# Patient Record
Sex: FEMALE | Race: OTHER | HISPANIC OR LATINO
[De-identification: names, ages, dates, MRNs, and addresses within clinical notes are randomized per-mention and may not be internally consistent; named-entity substitution may affect disease eponyms.]

---

## 2017-05-25 ENCOUNTER — APPOINTMENT (OUTPATIENT)
Dept: FAMILY MEDICINE | Facility: HOSPITAL | Age: 37
End: 2017-05-25

## 2019-01-17 ENCOUNTER — APPOINTMENT (OUTPATIENT)
Dept: FAMILY MEDICINE | Facility: HOSPITAL | Age: 39
End: 2019-01-17

## 2019-01-17 ENCOUNTER — OUTPATIENT (OUTPATIENT)
Dept: OUTPATIENT SERVICES | Facility: HOSPITAL | Age: 39
LOS: 1 days | End: 2019-01-17
Payer: SELF-PAY

## 2019-01-17 VITALS
SYSTOLIC BLOOD PRESSURE: 116 MMHG | WEIGHT: 169 LBS | BODY MASS INDEX: 34.72 KG/M2 | TEMPERATURE: 98 F | HEART RATE: 67 BPM | OXYGEN SATURATION: 100 % | DIASTOLIC BLOOD PRESSURE: 77 MMHG | RESPIRATION RATE: 16 BRPM

## 2019-01-17 DIAGNOSIS — Z00.00 ENCOUNTER FOR GENERAL ADULT MEDICAL EXAMINATION WITHOUT ABNORMAL FINDINGS: ICD-10-CM

## 2019-01-17 DIAGNOSIS — Z92.29 PERSONAL HISTORY OF OTHER DRUG THERAPY: ICD-10-CM

## 2019-01-17 PROCEDURE — 84443 ASSAY THYROID STIM HORMONE: CPT

## 2019-01-17 PROCEDURE — 80053 COMPREHEN METABOLIC PANEL: CPT

## 2019-01-17 PROCEDURE — 86706 HEP B SURFACE ANTIBODY: CPT

## 2019-01-17 PROCEDURE — 86787 VARICELLA-ZOSTER ANTIBODY: CPT

## 2019-01-17 PROCEDURE — 86765 RUBEOLA ANTIBODY: CPT

## 2019-01-17 PROCEDURE — 83036 HEMOGLOBIN GLYCOSYLATED A1C: CPT

## 2019-01-17 PROCEDURE — 80307 DRUG TEST PRSMV CHEM ANLYZR: CPT

## 2019-01-17 PROCEDURE — 80061 LIPID PANEL: CPT

## 2019-01-17 PROCEDURE — 86735 MUMPS ANTIBODY: CPT

## 2019-01-17 PROCEDURE — 86762 RUBELLA ANTIBODY: CPT

## 2019-01-17 PROCEDURE — G0463: CPT

## 2019-01-17 NOTE — PHYSICAL EXAM
[No Acute Distress] : no acute distress [Well Nourished] : well nourished [Well Developed] : well developed [Well-Appearing] : well-appearing [Normal Sclera/Conjunctiva] : normal sclera/conjunctiva [PERRL] : pupils equal round and reactive to light [Normal Outer Ear/Nose] : the outer ears and nose were normal in appearance [Normal Oropharynx] : the oropharynx was normal [No JVD] : no jugular venous distention [Supple] : supple [No Lymphadenopathy] : no lymphadenopathy [Thyroid Normal, No Nodules] : the thyroid was normal and there were no nodules present [No Respiratory Distress] : no respiratory distress  [Clear to Auscultation] : lungs were clear to auscultation bilaterally [No Accessory Muscle Use] : no accessory muscle use [Normal Rate] : normal rate  [Regular Rhythm] : with a regular rhythm [Normal S1, S2] : normal S1 and S2 [No Murmur] : no murmur heard [No Edema] : there was no peripheral edema [No Extremity Clubbing/Cyanosis] : no extremity clubbing/cyanosis [Soft] : abdomen soft [Non Tender] : non-tender [Non-distended] : non-distended [No Masses] : no abdominal mass palpated [Normal Posterior Cervical Nodes] : no posterior cervical lymphadenopathy [Normal Anterior Cervical Nodes] : no anterior cervical lymphadenopathy [No CVA Tenderness] : no CVA  tenderness [No Rash] : no rash [Normal Gait] : normal gait [Coordination Grossly Intact] : coordination grossly intact [Normal Affect] : the affect was normal [Alert and Oriented x3] : oriented to person, place, and time [Normal Insight/Judgement] : insight and judgment were intact

## 2019-01-17 NOTE — PAST MEDICAL HISTORY
[Menstruating] : menstruating [Definite ___ (Date)] : the last menstrual period was [unfilled] [Normal Amount/Duration] : it was of a normal amount and duration [Normal Duration] : the duration was normal [Regular Cycle Intervals] : have been regular

## 2019-01-18 LAB
ALBUMIN SERPL ELPH-MCNC: 3.9 G/DL
ALP BLD-CCNC: 74 U/L
ALT SERPL-CCNC: 12 U/L
ANION GAP SERPL CALC-SCNC: 12 MMOL/L
AST SERPL-CCNC: 20 U/L
BASOPHILS # BLD AUTO: 0.02 K/UL
BASOPHILS NFR BLD AUTO: 0.4 %
BILIRUB SERPL-MCNC: 0.3 MG/DL
BUN SERPL-MCNC: 10 MG/DL
CALCIUM SERPL-MCNC: 8.7 MG/DL
CHLORIDE SERPL-SCNC: 106 MMOL/L
CHOLEST SERPL-MCNC: 129 MG/DL
CHOLEST/HDLC SERPL: 2.7 RATIO
CO2 SERPL-SCNC: 21 MMOL/L
CREAT SERPL-MCNC: 0.5 MG/DL
EOSINOPHIL # BLD AUTO: 0.12 K/UL
EOSINOPHIL NFR BLD AUTO: 2.2 %
GLUCOSE SERPL-MCNC: 80 MG/DL
HBA1C MFR BLD HPLC: 5.4 %
HCT VFR BLD CALC: 39.6 %
HDLC SERPL-MCNC: 47 MG/DL
HGB BLD-MCNC: 12.6 G/DL
IMM GRANULOCYTES NFR BLD AUTO: 0.2 %
LDLC SERPL CALC-MCNC: 67 MG/DL
LYMPHOCYTES # BLD AUTO: 1.04 K/UL
LYMPHOCYTES NFR BLD AUTO: 19 %
MAN DIFF?: NORMAL
MCHC RBC-ENTMCNC: 29.5 PG
MCHC RBC-ENTMCNC: 31.8 GM/DL
MCV RBC AUTO: 92.7 FL
MEV IGG FLD QL IA: 99.6 AU/ML
MEV IGG+IGM SER-IMP: POSITIVE
MONOCYTES # BLD AUTO: 0.27 K/UL
MONOCYTES NFR BLD AUTO: 4.9 %
MUV AB SER-ACNC: POSITIVE
MUV IGG SER QL IA: 57.5 AU/ML
NEUTROPHILS # BLD AUTO: 4.02 K/UL
NEUTROPHILS NFR BLD AUTO: 73.3 %
PLATELET # BLD AUTO: 275 K/UL
POTASSIUM SERPL-SCNC: 4.2 MMOL/L
PROT SERPL-MCNC: 6.9 G/DL
RBC # BLD: 4.27 M/UL
RBC # FLD: 14.4 %
RUBV IGG FLD-ACNC: 5.1 INDEX
RUBV IGG SER-IMP: POSITIVE
SODIUM SERPL-SCNC: 139 MMOL/L
TRIGL SERPL-MCNC: 74 MG/DL
TSH SERPL-ACNC: 1.02 UIU/ML
VZV AB TITR SER: POSITIVE
VZV IGG SER IF-ACNC: 1353 INDEX
WBC # FLD AUTO: 5.48 K/UL

## 2019-01-23 NOTE — HISTORY OF PRESENT ILLNESS
[FreeTextEntry1] : Needs CPE and utox for work [de-identified] : 38F comes in for CPE and blood tests. Feels well, has no complaints at this time. Works in [employer], needs blood work and CPE for them.

## 2019-01-23 NOTE — HEALTH RISK ASSESSMENT
[No falls in past year] : Patient reported no falls in the past year [0] : 2) Feeling down, depressed, or hopeless: Not at all (0) [] : No [[id number]] : 0

## 2019-01-23 NOTE — ASSESSMENT
[FreeTextEntry1] : 38F comes in for CPE and blood work.\par \par 1. Health maintenance\par f/u CBC, CMP, lipids, TSH, Hba1c\par For work - f/u MMR, varicella, and hep B vaccine titers & utox\par Pt instructed she may need to repeat utox at facility requested at work\par \par RTC in 1-2 weeks for discuss obesity & lab work\par \par Discussed w/ Dr. West

## 2019-01-24 LAB
AMPHET UR-MCNC: NEGATIVE
BARBITURATES UR-MCNC: NEGATIVE
BENZODIAZ UR-MCNC: NEGATIVE
COCAINE METAB.OTHER UR-MCNC: NEGATIVE
CREATININE, URINE: 151.3 MG/DL
HBV SURFACE AB SER QL: NONREACTIVE
METHADONE UR-MCNC: NEGATIVE
METHAQUALONE UR-MCNC: NEGATIVE
OPIATES UR-MCNC: NEGATIVE
PCP UR-MCNC: NEGATIVE
PROPOXYPH UR QL: NEGATIVE
THC UR QL: NEGATIVE

## 2019-01-31 ENCOUNTER — APPOINTMENT (OUTPATIENT)
Age: 39
End: 2019-01-31

## 2019-01-31 ENCOUNTER — OUTPATIENT (OUTPATIENT)
Dept: OUTPATIENT SERVICES | Facility: HOSPITAL | Age: 39
LOS: 1 days | End: 2019-01-31
Payer: SELF-PAY

## 2019-01-31 VITALS
OXYGEN SATURATION: 100 % | HEIGHT: 58.5 IN | TEMPERATURE: 98.2 F | BODY MASS INDEX: 33.71 KG/M2 | SYSTOLIC BLOOD PRESSURE: 121 MMHG | RESPIRATION RATE: 16 BRPM | DIASTOLIC BLOOD PRESSURE: 78 MMHG | WEIGHT: 165 LBS | HEART RATE: 91 BPM

## 2019-01-31 DIAGNOSIS — Z11.1 ENCOUNTER FOR SCREENING FOR RESPIRATORY TUBERCULOSIS: ICD-10-CM

## 2019-01-31 DIAGNOSIS — E55.9 VITAMIN D DEFICIENCY, UNSPECIFIED: ICD-10-CM

## 2019-01-31 DIAGNOSIS — Z86.39 PERSONAL HISTORY OF OTHER ENDOCRINE, NUTRITIONAL AND METABOLIC DISEASE: ICD-10-CM

## 2019-01-31 DIAGNOSIS — Z00.00 ENCOUNTER FOR GENERAL ADULT MEDICAL EXAMINATION W/OUT ABNORMAL FINDINGS: ICD-10-CM

## 2019-01-31 DIAGNOSIS — Z11.3 ENCOUNTER FOR SCREENING FOR INFECTIONS WITH A PREDOMINANTLY SEXUAL MODE OF TRANSMISSION: ICD-10-CM

## 2019-01-31 DIAGNOSIS — Z00.00 ENCOUNTER FOR GENERAL ADULT MEDICAL EXAMINATION WITHOUT ABNORMAL FINDINGS: ICD-10-CM

## 2019-01-31 PROCEDURE — G0463: CPT

## 2019-01-31 NOTE — ASSESSMENT
[FreeTextEntry1] : 38 year old female pt here today for follow up visit\par \par #Encounter for follow up visit\par -lab results discussed with pt\par -print out of lab results given to pt\par -RTC prn\par \par

## 2019-01-31 NOTE — HISTORY OF PRESENT ILLNESS
[FreeTextEntry1] : follow up visit [de-identified] : 38 year old female pt here today to follow up for lab results related to new job. All questions related to blood work answered.

## 2019-01-31 NOTE — PHYSICAL EXAM
[No Acute Distress] : no acute distress [No Respiratory Distress] : no respiratory distress  [Clear to Auscultation] : lungs were clear to auscultation bilaterally [No Accessory Muscle Use] : no accessory muscle use [Normal Rate] : normal rate  [Regular Rhythm] : with a regular rhythm [Normal S1, S2] : normal S1 and S2 [Soft] : abdomen soft [Non Tender] : non-tender [Non-distended] : non-distended

## 2019-12-26 ENCOUNTER — OUTPATIENT (OUTPATIENT)
Dept: OUTPATIENT SERVICES | Facility: HOSPITAL | Age: 39
LOS: 1 days | End: 2019-12-26
Payer: SELF-PAY

## 2019-12-26 ENCOUNTER — APPOINTMENT (OUTPATIENT)
Dept: FAMILY MEDICINE | Facility: HOSPITAL | Age: 39
End: 2019-12-26

## 2019-12-26 ENCOUNTER — MED ADMIN CHARGE (OUTPATIENT)
Age: 39
End: 2019-12-26

## 2019-12-26 VITALS
DIASTOLIC BLOOD PRESSURE: 80 MMHG | OXYGEN SATURATION: 98 % | TEMPERATURE: 98 F | BODY MASS INDEX: 33.28 KG/M2 | HEART RATE: 69 BPM | SYSTOLIC BLOOD PRESSURE: 116 MMHG | WEIGHT: 162 LBS

## 2019-12-26 DIAGNOSIS — Z00.00 ENCOUNTER FOR GENERAL ADULT MEDICAL EXAMINATION WITHOUT ABNORMAL FINDINGS: ICD-10-CM

## 2019-12-26 DIAGNOSIS — Z09 ENCOUNTER FOR FOLLOW-UP EXAMINATION AFTER COMPLETED TREATMENT FOR CONDITIONS OTHER THAN MALIGNANT NEOPLASM: ICD-10-CM

## 2019-12-26 PROCEDURE — G0008: CPT

## 2019-12-26 PROCEDURE — G0463: CPT

## 2019-12-27 ENCOUNTER — OUTPATIENT (OUTPATIENT)
Dept: OUTPATIENT SERVICES | Facility: HOSPITAL | Age: 39
LOS: 1 days | End: 2019-12-27
Payer: SELF-PAY

## 2019-12-27 DIAGNOSIS — Z00.00 ENCOUNTER FOR GENERAL ADULT MEDICAL EXAMINATION WITHOUT ABNORMAL FINDINGS: ICD-10-CM

## 2019-12-27 PROBLEM — Z09 FOLLOW-UP EXAM: Status: RESOLVED | Noted: 2019-01-31 | Resolved: 2019-12-27

## 2019-12-27 LAB
BASOPHILS # BLD AUTO: 0.04 K/UL — SIGNIFICANT CHANGE UP (ref 0–0.2)
BASOPHILS NFR BLD AUTO: 0.8 % — SIGNIFICANT CHANGE UP (ref 0–2)
EOSINOPHIL # BLD AUTO: 0.17 K/UL — SIGNIFICANT CHANGE UP (ref 0–0.5)
EOSINOPHIL NFR BLD AUTO: 3.3 % — SIGNIFICANT CHANGE UP (ref 0–6)
HCT VFR BLD CALC: 36.3 % — SIGNIFICANT CHANGE UP (ref 34.5–45)
HGB BLD-MCNC: 12.1 G/DL — SIGNIFICANT CHANGE UP (ref 11.5–15.5)
IMM GRANULOCYTES NFR BLD AUTO: 0.2 % — SIGNIFICANT CHANGE UP (ref 0–1.5)
LYMPHOCYTES # BLD AUTO: 0.73 K/UL — LOW (ref 1–3.3)
LYMPHOCYTES # BLD AUTO: 14.2 % — SIGNIFICANT CHANGE UP (ref 13–44)
MCHC RBC-ENTMCNC: 30.4 PG — SIGNIFICANT CHANGE UP (ref 27–34)
MCHC RBC-ENTMCNC: 33.3 GM/DL — SIGNIFICANT CHANGE UP (ref 32–36)
MCV RBC AUTO: 91.2 FL — SIGNIFICANT CHANGE UP (ref 80–100)
MONOCYTES # BLD AUTO: 0.31 K/UL — SIGNIFICANT CHANGE UP (ref 0–0.9)
MONOCYTES NFR BLD AUTO: 6 % — SIGNIFICANT CHANGE UP (ref 2–14)
NEUTROPHILS # BLD AUTO: 3.89 K/UL — SIGNIFICANT CHANGE UP (ref 1.8–7.4)
NEUTROPHILS NFR BLD AUTO: 75.5 % — SIGNIFICANT CHANGE UP (ref 43–77)
NRBC # BLD: 0 /100 WBCS — SIGNIFICANT CHANGE UP (ref 0–0)
PLATELET # BLD AUTO: 233 K/UL — SIGNIFICANT CHANGE UP (ref 150–400)
RBC # BLD: 3.98 M/UL — SIGNIFICANT CHANGE UP (ref 3.8–5.2)
RBC # FLD: 13.4 % — SIGNIFICANT CHANGE UP (ref 10.3–14.5)
WBC # BLD: 5.15 K/UL — SIGNIFICANT CHANGE UP (ref 3.8–10.5)
WBC # FLD AUTO: 5.15 K/UL — SIGNIFICANT CHANGE UP (ref 3.8–10.5)

## 2019-12-27 PROCEDURE — 86780 TREPONEMA PALLIDUM: CPT

## 2019-12-27 PROCEDURE — 83036 HEMOGLOBIN GLYCOSYLATED A1C: CPT

## 2019-12-27 PROCEDURE — 80061 LIPID PANEL: CPT

## 2019-12-27 PROCEDURE — 85027 COMPLETE CBC AUTOMATED: CPT

## 2019-12-27 PROCEDURE — 87800 DETECT AGNT MULT DNA DIREC: CPT

## 2019-12-27 PROCEDURE — 80053 COMPREHEN METABOLIC PANEL: CPT

## 2019-12-27 PROCEDURE — 82306 VITAMIN D 25 HYDROXY: CPT

## 2019-12-27 PROCEDURE — 87624 HPV HI-RISK TYP POOLED RSLT: CPT

## 2019-12-27 PROCEDURE — 86695 HERPES SIMPLEX TYPE 1 TEST: CPT

## 2019-12-27 PROCEDURE — 84443 ASSAY THYROID STIM HORMONE: CPT

## 2019-12-27 PROCEDURE — 87389 HIV-1 AG W/HIV-1&-2 AB AG IA: CPT

## 2019-12-27 NOTE — HEALTH RISK ASSESSMENT
[Good] : ~his/her~  mood as  good [No] : In the past 12 months have you used drugs other than those required for medical reasons? No [0] : 2) Feeling down, depressed, or hopeless: Not at all (0) [Patient reported PAP Smear was normal] : Patient reported PAP Smear was normal [HIV Test offered] : HIV Test offered [With Family] : lives with family [# of Members in Household ___] :  household currently consist of [unfilled] member(s) [Employed] : employed [# Of Children ___] : has [unfilled] children [Sexually Active] : sexually active [Feels Safe at Home] : Feels safe at home [FreeTextEntry1] : Vaginal mass noted 1 weeks ago [] : No [[id number]] : 0 [PapSmearDate] : 05/14 [FreeTextEntry3] :  [PapSmearComments] : Perform

## 2019-12-27 NOTE — PAST MEDICAL HISTORY
[Menstruating] : menstruating [Definite ___ (Date)] : the last menstrual period was [unfilled] [Normal Duration] : the duration was normal [Regular Cycle Intervals] : have been regular [Total Preg ___] : G[unfilled] [Live Births ___] : P[unfilled]  [Living ___] : Living: [unfilled]

## 2019-12-27 NOTE — PLAN
[FreeTextEntry1] : Vaginal Mass\par -Likely Bartholin cyst\par -F/u GC, HSV 1/2, HIV, Syphilis screening\par -Will schedule visit for I&D\par \par CPE\par -CBC, Lipid panel, TSH, Vitamin D ordered\par -HA1c ordered due to FMHx of diabetes and previous elevated A1C.\par -Pap/Hr mRNA performed today\par \par RTC 2 weeks for lab f/u and I&D\par \par

## 2019-12-27 NOTE — HISTORY OF PRESENT ILLNESS
[FreeTextEntry1] : CPE [de-identified] : 38 y/o F no significant PMHx presents for CPE. Would like to have bloodwork done and exam of vaginal mass she noticed 1 week ago. Felt like a hard lump but denies any pain, bleeding, discharge. There is no pain with intercourse. LMP was 12/4, lasting 3-4 days. Not painful, no bleeding or discharge, no pain with intercourse. LMP 12/4 monthly 3-4 days. Other wise feeling well. Denies fever, dysuria, SOB,CP, abd pain, history of STDS. Sexually active with one male. Would also like to have pap smear.

## 2019-12-27 NOTE — PHYSICAL EXAM
[No Acute Distress] : no acute distress [Well Nourished] : well nourished [Well Developed] : well developed [Well-Appearing] : well-appearing [Normal Sclera/Conjunctiva] : normal sclera/conjunctiva [PERRL] : pupils equal round and reactive to light [EOMI] : extraocular movements intact [Normal Outer Ear/Nose] : the outer ears and nose were normal in appearance [Normal Oropharynx] : the oropharynx was normal [No JVD] : no jugular venous distention [No Lymphadenopathy] : no lymphadenopathy [Supple] : supple [Thyroid Normal, No Nodules] : the thyroid was normal and there were no nodules present [No Respiratory Distress] : no respiratory distress  [No Accessory Muscle Use] : no accessory muscle use [Clear to Auscultation] : lungs were clear to auscultation bilaterally [Normal Rate] : normal rate  [Regular Rhythm] : with a regular rhythm [Normal S1, S2] : normal S1 and S2 [Pedal Pulses Present] : the pedal pulses are present [No Murmur] : no murmur heard [No Edema] : there was no peripheral edema [Rt] : varicose veins of the right leg noted [Lt] : varicose veins of the left leg noted [Normal Appearance] : normal in appearance [No Nipple Discharge] : no nipple discharge [No Axillary Lymphadenopathy] : no axillary lymphadenopathy [Examination Of The Breasts] : a normal appearance [Normal] : normal [No Discharge] : no discharge [No Masses] : no breast masses were palpable [Urethral Meatus] : the meatus of the urethra showed no abnormalities [Cervix] : normal cervix [Uterine Adnexae] : normal adnexa [Uterus] : uterus was normal size, without masses or tenderness [Mass ___ cm] : a [unfilled] ~Ucm mass [Normal Posterior Cervical Nodes] : no posterior cervical lymphadenopathy [No CVA Tenderness] : no CVA  tenderness [Normal Anterior Cervical Nodes] : no anterior cervical lymphadenopathy [No Spinal Tenderness] : no spinal tenderness [Grossly Normal Strength/Tone] : grossly normal strength/tone [Coordination Grossly Intact] : coordination grossly intact [Normal Insight/Judgement] : insight and judgment were intact [Normal Affect] : the affect was normal [Normal Gait] : normal gait [Breast Palpation Diffuse Fibrous Tissue Bilateral] : no fibrocystic changes [Dimpling In Both Breasts] : no dimpling [Breast - Peau D'Orange Bilateral] : no Peau D'Orange [Breast Implant Bilateral] : no implants [Axillary Lymph Nodes Enlarged Bilaterally] : no enlarged nodes [FreeTextEntry1] : Right labial mass palpated, Bartholin gland enlargement approx 3cm, firm, not TTP, no discharge

## 2019-12-27 NOTE — REVIEW OF SYSTEMS
[Fever] : no fever [Fatigue] : no fatigue [Hearing Loss] : no hearing loss [Pain] : no pain [Vision Problems] : no vision problems [Sore Throat] : no sore throat [Nasal Discharge] : no nasal discharge [Palpitations] : no palpitations [Leg Claudication] : no leg claudication [Chest Pain] : no chest pain [Lower Ext Edema] : no lower extremity edema [Shortness Of Breath] : no shortness of breath [Abdominal Pain] : no abdominal pain [Nausea] : no nausea [Wheezing] : no wheezing [Constipation] : no constipation [Vomiting] : no vomiting [Diarrhea] : diarrhea [Frequency] : no frequency [Dysuria] : no dysuria [Vaginal Discharge] : no vaginal discharge [Dysmenorrhea] : no dysmenorrhea [Joint Pain] : no joint pain [Back Pain] : no back pain [Itching] : no itching [Skin Rash] : no skin rash [Headache] : no headache [Dizziness] : no dizziness [Suicidal] : not suicidal [Anxiety] : no anxiety [Depression] : no depression [FreeTextEntry8] : vaginal mass w/o pain

## 2019-12-27 NOTE — ASSESSMENT
[FreeTextEntry1] : 38 y/o F no significant PMHx presents for CPE. Would like to have bloodwork done and exam of vaginal mass she noticed 1 week ago.

## 2019-12-28 DIAGNOSIS — Z12.4 ENCOUNTER FOR SCREENING FOR MALIGNANT NEOPLASM OF CERVIX: ICD-10-CM

## 2019-12-28 DIAGNOSIS — N89.8 OTHER SPECIFIED NONINFLAMMATORY DISORDERS OF VAGINA: ICD-10-CM

## 2020-01-02 ENCOUNTER — APPOINTMENT (OUTPATIENT)
Dept: FAMILY MEDICINE | Facility: HOSPITAL | Age: 40
End: 2020-01-02

## 2020-01-14 LAB
25(OH)D3 SERPL-MCNC: 22.7 NG/ML
ALBUMIN SERPL ELPH-MCNC: 3.8 G/DL
ALP BLD-CCNC: 70 U/L
ALT SERPL-CCNC: 14 U/L
ANION GAP SERPL CALC-SCNC: 11 MMOL/L
AST SERPL-CCNC: 14 U/L
BILIRUB SERPL-MCNC: 0.4 MG/DL
BUN SERPL-MCNC: 11 MG/DL
C TRACH RRNA SPEC QL NAA+PROBE: NOT DETECTED
CALCIUM SERPL-MCNC: 8.6 MG/DL
CHLORIDE SERPL-SCNC: 108 MMOL/L
CHOLEST SERPL-MCNC: 126 MG/DL
CHOLEST/HDLC SERPL: 2.6 RATIO
CO2 SERPL-SCNC: 21 MMOL/L
CREAT SERPL-MCNC: 0.49 MG/DL
CYTOLOGY CVX/VAG DOC THIN PREP: NORMAL
ESTIMATED AVERAGE GLUCOSE: 105 MG/DL
GLUCOSE SERPL-MCNC: 96 MG/DL
HBA1C MFR BLD HPLC: 5.3 %
HDLC SERPL-MCNC: 48 MG/DL
HIV1+2 AB SPEC QL IA.RAPID: NONREACTIVE
HPV HIGH+LOW RISK DNA PNL CVX: NOT DETECTED
HSV 1+2 IGG SER IA-IMP: NEGATIVE
HSV 1+2 IGG SER IA-IMP: POSITIVE
HSV1 IGG SER QL: 28.6 INDEX
HSV2 IGG SER QL: 0.15 INDEX
LDLC SERPL CALC-MCNC: 68 MG/DL
N GONORRHOEA RRNA SPEC QL NAA+PROBE: NOT DETECTED
POTASSIUM SERPL-SCNC: 4.2 MMOL/L
PROT SERPL-MCNC: 6.3 G/DL
SODIUM SERPL-SCNC: 140 MMOL/L
SOURCE AMPLIFICATION: NORMAL
T PALLIDUM AB SER QL IA: NEGATIVE
TRIGL SERPL-MCNC: 48 MG/DL
TSH SERPL-ACNC: 1.73 UIU/ML

## 2020-04-15 DIAGNOSIS — R63.0 ANOREXIA: ICD-10-CM

## 2020-04-15 DIAGNOSIS — Z92.89 PERSONAL HISTORY OF OTHER MEDICAL TREATMENT: ICD-10-CM

## 2020-04-15 DIAGNOSIS — R68.89 OTHER GENERAL SYMPTOMS AND SIGNS: ICD-10-CM

## 2020-04-15 DIAGNOSIS — Z98.890 OTHER SPECIFIED POSTPROCEDURAL STATES: ICD-10-CM

## 2020-04-16 PROBLEM — R63.0 POOR APPETITE: Status: ACTIVE | Noted: 2020-04-16

## 2020-04-16 PROBLEM — Z98.890 HISTORY OF PAPANICOLAOU SMEAR: Status: RESOLVED | Noted: 2019-12-26 | Resolved: 2020-04-16

## 2020-04-16 PROBLEM — Z92.89 HISTORY OF PREVIOUS PHYSICAL EXAMINATION: Status: RESOLVED | Noted: 2019-12-26 | Resolved: 2020-04-16

## 2020-04-22 PROBLEM — R68.89 SUSPECTED COVID-19 VIRUS INFECTION: Status: ACTIVE | Noted: 2020-04-22

## 2020-04-22 NOTE — HISTORY OF PRESENT ILLNESS
[Verbal consent obtained from patient] : the patient, [unfilled] [FreeTextEntry1] :  John # [id number]\par \par I called pt and was able to talk to her this time, she said that she didn't feel good , had severe abdominal pain and first came to [facility] and was told that in this hospital they just for COVID patients and since her abdominal pain was excruciating she went to [facility]. she that she  and was told that she had pneumonia on CXR,  and also was told that her abdominal pain may be due to COVID and test done , given antibiotic for pneumonia cephalexin and azithromycin, Potassium 20 MEQ and was discharge home. she received a call that her test is positive, she is almost done with her antibiotic only one day left for her cephalexin. \par she still has abdominal pain. but all other symptoms are resolved\par \par  [Time Spent: ___ minutes] : I have spent [unfilled] minutes with the patient on the telephone

## 2020-04-22 NOTE — PLAN
[FreeTextEntry1] : patient has COVID-19 infection by testing at [facility]. she is doing much better, she received instruction over the phone for that hospital for another week of self quarantine and we discussed the importance of it and need for hydration and well balance diet which are rich in vitamin c and zinc discussed. pt verbalized understanding and I informed her since it is day 7 of her disease she should be aware that the disease may change the course and she can go to ER if any severe issue from abdominal pain to SOB and new high fever.

## 2020-04-22 NOTE — HISTORY OF PRESENT ILLNESS
[FreeTextEntry1] : called pt again today and no answer, could not leave a message, will send a letter

## 2020-04-22 NOTE — PLAN
[FreeTextEntry1] : pt most probably has mild case of COVID from his brother who had mild case and didn't do testing. personal hygiene and hydration and well balance diet including vitamin C and Zinc rich diet d/w pt. will send Mucinex and omeprazole for pt and I will call pt tomorrow. I advised her to go to ER in case of severe SOB.

## 2020-04-22 NOTE — HISTORY OF PRESENT ILLNESS
[Verbal consent obtained from patient] : the patient, [unfilled] [Time Spent: ___ minutes] : I have spent [unfilled] minutes with the patient on the telephone [FreeTextEntry1] : pt called that she is having symptoms of COVID-19 and requested a call back. I called pt , she said that since Monday night she started to have abd/ epigastric pain,and had fever 101 , since then when ever she has fever she takes Tylenol which helps with fever. since this morning she has no fever and didn't take any med. she also said that she has this severe epigastric pain and cough which gives her pain in her throat. she denies chills but has an episode of severe SOB last night which has been resolved, no HA, sneezing,runny nose, body ache, diarrhea. has severe fatigue and moderate loss of appetite. taste and smell sensation intact

## 2020-07-22 ENCOUNTER — APPOINTMENT (OUTPATIENT)
Dept: FAMILY MEDICINE | Facility: HOSPITAL | Age: 40
End: 2020-07-22

## 2020-07-22 ENCOUNTER — OUTPATIENT (OUTPATIENT)
Dept: OUTPATIENT SERVICES | Facility: HOSPITAL | Age: 40
LOS: 1 days | End: 2020-07-22
Payer: MEDICARE

## 2020-07-22 VITALS
BODY MASS INDEX: 34.72 KG/M2 | SYSTOLIC BLOOD PRESSURE: 102 MMHG | RESPIRATION RATE: 14 BRPM | HEART RATE: 81 BPM | TEMPERATURE: 98.24 F | OXYGEN SATURATION: 95 % | WEIGHT: 169 LBS | DIASTOLIC BLOOD PRESSURE: 72 MMHG

## 2020-07-22 DIAGNOSIS — Z87.898 PERSONAL HISTORY OF OTHER SPECIFIED CONDITIONS: ICD-10-CM

## 2020-07-22 DIAGNOSIS — U07.1 COVID-19: ICD-10-CM

## 2020-07-22 DIAGNOSIS — Z00.00 ENCOUNTER FOR GENERAL ADULT MEDICAL EXAMINATION WITHOUT ABNORMAL FINDINGS: ICD-10-CM

## 2020-07-22 DIAGNOSIS — R50.81 FEVER PRESENTING WITH CONDITIONS CLASSIFIED ELSEWHERE: ICD-10-CM

## 2020-07-22 DIAGNOSIS — N89.8 OTHER SPECIFIED NONINFLAMMATORY DISORDERS OF VAGINA: ICD-10-CM

## 2020-07-23 DIAGNOSIS — F50.81 BINGE EATING DISORDER: ICD-10-CM

## 2020-07-23 DIAGNOSIS — U07.1 COVID-19: ICD-10-CM

## 2020-07-23 DIAGNOSIS — N89.8 OTHER SPECIFIED NONINFLAMMATORY DISORDERS OF VAGINA: ICD-10-CM

## 2020-07-23 DIAGNOSIS — Z87.898 PERSONAL HISTORY OF OTHER SPECIFIED CONDITIONS: ICD-10-CM

## 2020-07-23 PROBLEM — R50.81 FEVER IN OTHER DISEASES: Status: RESOLVED | Noted: 2020-04-15 | Resolved: 2020-07-23

## 2020-07-23 PROCEDURE — 86769 SARS-COV-2 COVID-19 ANTIBODY: CPT

## 2020-07-23 PROCEDURE — 80048 BASIC METABOLIC PNL TOTAL CA: CPT

## 2020-07-23 PROCEDURE — G0463: CPT

## 2020-07-23 PROCEDURE — 36415 COLL VENOUS BLD VENIPUNCTURE: CPT

## 2020-07-23 RX ORDER — OMEPRAZOLE 20 MG/1
20 CAPSULE, DELAYED RELEASE ORAL DAILY
Qty: 30 | Refills: 0 | Status: COMPLETED | COMMUNITY
Start: 2020-04-15 | End: 2020-07-23

## 2020-07-23 RX ORDER — GUAIFENESIN 600 MG/1
600 TABLET, EXTENDED RELEASE ORAL TWICE DAILY
Qty: 40 | Refills: 0 | Status: COMPLETED | COMMUNITY
Start: 2020-04-15 | End: 2020-07-23

## 2020-07-23 NOTE — PHYSICAL EXAM
[No Acute Distress] : no acute distress [Well-Appearing] : well-appearing [Clear to Auscultation] : lungs were clear to auscultation bilaterally [No Respiratory Distress] : no respiratory distress  [No Accessory Muscle Use] : no accessory muscle use [Regular Rhythm] : with a regular rhythm [Normal S1, S2] : normal S1 and S2 [Normal Rate] : normal rate  [Soft] : abdomen soft [No Murmur] : no murmur heard [No Masses] : no abdominal mass palpated [Non-distended] : non-distended [Non Tender] : non-tender [Normal Affect] : the affect was normal [Normal Bowel Sounds] : normal bowel sounds [No HSM] : no HSM [Normal Insight/Judgement] : insight and judgment were intact

## 2020-07-23 NOTE — HISTORY OF PRESENT ILLNESS
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 Pt feeling well overall today. Was seen in Dec 2019 for CPE. Pt was sick with COVID19 infection in April but was not hospitalized and has since recovered; denies fever, chills, HA, anosmia, cough, SOB, CP, n/v/d, abd pain. 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 Pt did not bring from from work; states she needs letter stating she received physical within past year and PPD.

## 2020-07-23 NOTE — PLAN
[FreeTextEntry1] : #PPD placement\par -Placed in office today on right forearm; pt understands she must return to ED saturday for PPD read\par \par #Past COVID infection\par -COVID ab test ordered today\par -BMP ordered to f/u on hypokalemia noted while ill\par \par #HCM\par -Tdap today\par \par #Work Clearance form\par -CPE 12/2019, lab reviewed and wnl\par -No form provided from patient today\par -Pt needs letter faxed to her work for clearance. Letter available in chart

## 2020-07-23 NOTE — REVIEW OF SYSTEMS
[Fever] : no fever [Chills] : no chills [Chest Pain] : no chest pain [Palpitations] : no palpitations [Shortness Of Breath] : no shortness of breath [Cough] : no cough [Abdominal Pain] : no abdominal pain [Nausea] : no nausea [Diarrhea] : diarrhea [Vomiting] : no vomiting [Headache] : no headache

## 2020-08-04 LAB
ANION GAP SERPL CALC-SCNC: 11 MMOL/L
BUN SERPL-MCNC: 7 MG/DL
CALCIUM SERPL-MCNC: 8.9 MG/DL
CHLORIDE SERPL-SCNC: 107 MMOL/L
CO2 SERPL-SCNC: 22 MMOL/L
CREAT SERPL-MCNC: 0.51 MG/DL
GLUCOSE SERPL-MCNC: 87 MG/DL
POTASSIUM SERPL-SCNC: 4.4 MMOL/L
SARS-COV-2 IGG SERPL IA-ACNC: 109 INDEX
SARS-COV-2 IGG SERPL QL IA: POSITIVE
SODIUM SERPL-SCNC: 140 MMOL/L

## 2021-06-17 ENCOUNTER — EMERGENCY (EMERGENCY)
Facility: HOSPITAL | Age: 41
LOS: 1 days | Discharge: HOME | End: 2021-06-17
Attending: EMERGENCY MEDICINE | Admitting: EMERGENCY MEDICINE
Payer: MEDICAID

## 2021-06-17 VITALS
WEIGHT: 175.93 LBS | OXYGEN SATURATION: 99 % | SYSTOLIC BLOOD PRESSURE: 108 MMHG | HEART RATE: 93 BPM | RESPIRATION RATE: 18 BRPM | TEMPERATURE: 98 F | DIASTOLIC BLOOD PRESSURE: 69 MMHG | HEIGHT: 59 IN

## 2021-06-17 PROCEDURE — 99283 EMERGENCY DEPT VISIT LOW MDM: CPT

## 2021-06-17 PROCEDURE — 99284 EMERGENCY DEPT VISIT MOD MDM: CPT

## 2021-06-17 RX ORDER — ACETAMINOPHEN 500 MG
650 TABLET ORAL ONCE
Refills: 0 | Status: COMPLETED | OUTPATIENT
Start: 2021-06-17 | End: 2021-06-17

## 2021-06-17 RX ADMIN — Medication 650 MILLIGRAM(S): at 19:25

## 2021-06-17 RX ADMIN — Medication 1 TABLET(S): at 19:26

## 2021-06-17 NOTE — ED PROVIDER NOTE - NSFOLLOWUPINSTRUCTIONS_ED_ALL_ED_FT
Follow up with Family practice Dr maria tomorrow morning at 8:30, you can walk in for an appointment as per Manuel Montoya.   For pain control if needed take Tylenol 650 mg 1 tab every 4-6 hours. In addition complete the Augmentin 875 mg 1 tab twice a day for 7 days. if any fevers, chills, any worsening, concerning or new signs or symporter return to the ER.

## 2021-06-17 NOTE — ED PROVIDER NOTE - OBJECTIVE STATEMENT
42 yo female, no pmh comes to the ED co pelvic pain. States has had left sided vaginal pain with a bump worsening over the past 3 days.  States the bump was smaller a few months ago when she saw gyn. Was told at the time they could drain it but to leave it for now.   As per patient the bump has been getting larger over the past few ,months and more painful over the past 3 days.  Denies any fevers, chills, n/v, vaginal dc, burning, urgency , [id number] on urination, abd pains, or any other complaints.

## 2021-06-17 NOTE — ED PROVIDER NOTE - CLINICAL SUMMARY MEDICAL DECISION MAKING FREE TEXT BOX
42 yo female, no pmh comes to the ED co pelvic pain. States has had left sided vaginal pain with a bump worsening over the past 3 days.  States the bump was smaller a few months ago when she saw gyn. Was told at the time they could drain it but to leave it for now.   As per patient the bump has been getting larger over the past few ,months and more painful over the past 3 days.  Denies any fevers, chills, n/v, vaginal dc, burning, urgency , [id number] on urination, abd pains, or any other complaints.  Left vulva abscess, SW dr Jackson will see patient at 8:30 tomorrow morning in the clinic .  Will start Augmentin in the mean time.

## 2021-06-17 NOTE — ED PROVIDER NOTE - PATIENT PORTAL LINK FT
You can access the FollowMyHealth Patient Portal offered by [facility] by registering at the following website: [email or web address]. By joining [email or web address]’s FollowMyHealth portal, you will also be able to view your health information using other applications (apps) compatible with our system.

## 2021-06-17 NOTE — ED PROVIDER NOTE - ATTENDING CONTRIBUTION TO CARE
I have personally seen and examined this patient. I have fully participated in the care of this patient. I have reviewed all pertinent clinical information, including history physical exam, plan and the PA's note and agree except as noted  42 yo female, no pmh comes to the ED co pelvic pain. States has had left sided vaginal pain with a bump worsening over the past 3 days.  States the bump was smaller a few months ago when she saw gyn. Was told at the time they could drain it but to leave it for now.   As per patient the bump has been getting larger over the past few ,months and more painful over the past 3 days.  Denies any fevers, chills, n/v, vaginal dc, burning, urgency , [id number] on urination, abd pains, or any other complaints.

## 2021-06-18 ENCOUNTER — APPOINTMENT (OUTPATIENT)
Dept: FAMILY MEDICINE | Facility: HOSPITAL | Age: 41
End: 2021-06-18

## 2021-06-18 ENCOUNTER — NON-APPOINTMENT (OUTPATIENT)
Age: 41
End: 2021-06-18

## 2021-06-18 ENCOUNTER — OUTPATIENT (OUTPATIENT)
Dept: OUTPATIENT SERVICES | Facility: HOSPITAL | Age: 41
LOS: 1 days | End: 2021-06-18
Payer: SELF-PAY

## 2021-06-18 VITALS
HEART RATE: 90 BPM | BODY MASS INDEX: 35.75 KG/M2 | WEIGHT: 174 LBS | DIASTOLIC BLOOD PRESSURE: 70 MMHG | OXYGEN SATURATION: 97 % | RESPIRATION RATE: 16 BRPM | SYSTOLIC BLOOD PRESSURE: 106 MMHG | TEMPERATURE: 209.3 F

## 2021-06-18 DIAGNOSIS — Z00.00 ENCOUNTER FOR GENERAL ADULT MEDICAL EXAMINATION WITHOUT ABNORMAL FINDINGS: ICD-10-CM

## 2021-06-18 PROCEDURE — 87070 CULTURE OTHR SPECIMN AEROBIC: CPT

## 2021-06-18 PROCEDURE — G0463: CPT

## 2021-06-18 PROCEDURE — 10060 I&D ABSCESS SIMPLE/SINGLE: CPT

## 2021-06-18 PROCEDURE — 87077 CULTURE AEROBIC IDENTIFY: CPT

## 2021-06-21 DIAGNOSIS — Z98.890 OTHER SPECIFIED POSTPROCEDURAL STATES: ICD-10-CM

## 2021-06-21 DIAGNOSIS — N76.4 ABSCESS OF VULVA: ICD-10-CM

## 2021-06-23 LAB — BACTERIA SPEC CULT: ABNORMAL

## 2021-06-23 NOTE — PLAN
[FreeTextEntry1] : \par \par #Vulvar Abscess\par - 4 cm localized to the left lower labia\par - now s/p incision and drainage of 200 cc of pus like fluid\par - Cultures sent\par - Patient to continue antibiotics, will recommend a total of 10 day treatment, prescription for additional antibiotics sent\par \par \par RTC in 1 week for follow up\par \par \par Above discussed with Dr. Jackson

## 2021-06-23 NOTE — PHYSICAL EXAM
[Well Developed] : well developed [Well-Appearing] : well-appearing [No Respiratory Distress] : no respiratory distress  [Clear to Auscultation] : lungs were clear to auscultation bilaterally [Normal Rate] : normal rate  [Regular Rhythm] : with a regular rhythm [Normal S1, S2] : normal S1 and S2 [Urethral Meatus] : the meatus of the urethra showed no abnormalities [Vagina] : normal vaginal exam [Anus Abnormality] : the anus and perineum were normal [Vulvar Mass ___ cm] : a [unfilled] ~Ucm vulvar mass [de-identified] : D

## 2021-06-23 NOTE — REVIEW OF SYSTEMS
[Fever] : no fever [Chills] : no chills [Fatigue] : no fatigue [Chest Pain] : no chest pain [Palpitations] : no palpitations [Shortness Of Breath] : no shortness of breath [Cough] : no cough [Dyspnea on Exertion] : no dyspnea on exertion [Vaginal Discharge] : no vaginal discharge [Dysmenorrhea] : no dysmenorrhea

## 2021-06-23 NOTE — ASSESSMENT
[FreeTextEntry1] : Patient is a 42 y/o female with medical history as stated above here for acute visit for vulvar abscess:

## 2021-06-23 NOTE — HISTORY OF PRESENT ILLNESS
[FreeTextEntry8] : Patient is a 40 y/o female with no significant medical history presenting for an acute visit for 1 week history of worsening vulvarvaginal pain \par \par \par Patient was seen in the ED yesterday- was told she had an abscess and was prescribed 7 day course of antibiotics.\par \par \par Today she states she is still having pain and has a noticeable swelling in the vulvar area. Reports seeing this one year ago, but more recently has increased in size and worsened in pain. No fevers, chills, nausea, vomiting

## 2021-06-25 ENCOUNTER — OUTPATIENT (OUTPATIENT)
Dept: OUTPATIENT SERVICES | Facility: HOSPITAL | Age: 41
LOS: 1 days | End: 2021-06-25
Payer: SELF-PAY

## 2021-06-25 ENCOUNTER — APPOINTMENT (OUTPATIENT)
Dept: FAMILY MEDICINE | Facility: HOSPITAL | Age: 41
End: 2021-06-25

## 2021-06-25 VITALS
RESPIRATION RATE: 16 BRPM | TEMPERATURE: 207.14 F | BODY MASS INDEX: 43.51 KG/M2 | HEIGHT: 55 IN | DIASTOLIC BLOOD PRESSURE: 77 MMHG | OXYGEN SATURATION: 97 % | SYSTOLIC BLOOD PRESSURE: 112 MMHG | HEART RATE: 72 BPM | WEIGHT: 188 LBS

## 2021-06-25 DIAGNOSIS — Z00.00 ENCOUNTER FOR GENERAL ADULT MEDICAL EXAMINATION WITHOUT ABNORMAL FINDINGS: ICD-10-CM

## 2021-06-25 PROBLEM — Z78.9 OTHER SPECIFIED HEALTH STATUS: Chronic | Status: ACTIVE | Noted: 2021-06-17

## 2021-06-25 PROCEDURE — G0463: CPT

## 2021-06-29 DIAGNOSIS — N76.4 ABSCESS OF VULVA: ICD-10-CM

## 2021-06-30 ENCOUNTER — OUTPATIENT (OUTPATIENT)
Dept: OUTPATIENT SERVICES | Facility: HOSPITAL | Age: 41
LOS: 1 days | End: 2021-06-30
Payer: SELF-PAY

## 2021-06-30 ENCOUNTER — APPOINTMENT (OUTPATIENT)
Dept: FAMILY MEDICINE | Facility: HOSPITAL | Age: 41
End: 2021-06-30

## 2021-06-30 VITALS
HEART RATE: 66 BPM | BODY MASS INDEX: 53.71 KG/M2 | RESPIRATION RATE: 16 BRPM | DIASTOLIC BLOOD PRESSURE: 75 MMHG | OXYGEN SATURATION: 99 % | WEIGHT: 176 LBS | TEMPERATURE: 206.42 F | SYSTOLIC BLOOD PRESSURE: 105 MMHG

## 2021-06-30 VITALS — BODY MASS INDEX: 1237.43 KG/M2 | TEMPERATURE: 96.9 F | HEIGHT: 55 IN

## 2021-06-30 DIAGNOSIS — Z00.00 ENCOUNTER FOR GENERAL ADULT MEDICAL EXAMINATION WITHOUT ABNORMAL FINDINGS: ICD-10-CM

## 2021-06-30 PROCEDURE — G0463: CPT

## 2021-07-01 DIAGNOSIS — N76.4 ABSCESS OF VULVA: ICD-10-CM

## 2021-07-01 NOTE — HISTORY OF PRESENT ILLNESS
[FreeTextEntry1] : Follow up [de-identified] : Patient is a 42 y/o female with history of obesity presenting for follow up visit s/p incision and drainage of vulvar abscess\par \par Today patient states she feels well-\par \par Pain improved, has not noticed any additional drainage, swelling, denies fevers, chills, nausea vomiiting, bleeding\par \par \par

## 2021-07-01 NOTE — REVIEW OF SYSTEMS
[Fever] : no fever [Chills] : no chills [Fatigue] : no fatigue [Chest Pain] : no chest pain [Palpitations] : no palpitations [Shortness Of Breath] : no shortness of breath [Dyspnea on Exertion] : no dyspnea on exertion [Abdominal Pain] : no abdominal pain [Nausea] : no nausea [Vomiting] : no vomiting [Vaginal Discharge] : no vaginal discharge

## 2021-07-01 NOTE — PHYSICAL EXAM
[No Acute Distress] : no acute distress [Well Developed] : well developed [Well-Appearing] : well-appearing [Normal Rate] : normal rate  [Regular Rhythm] : with a regular rhythm [Normal S1, S2] : normal S1 and S2 [Soft] : abdomen soft [Non Tender] : non-tender [Normal Bowel Sounds] : normal bowel sounds [Urethral Meatus] : normal urethra [External Female Genitalia] : normal external genitalia [Vagina] : normal vaginal exam [Uterus] : uterus was normal size, without masses or tenderness [Uterine Adnexae] : normal adnexa

## 2021-07-06 RX ORDER — AMOXICILLIN AND CLAVULANATE POTASSIUM 875; 125 MG/1; MG/1
875-125 TABLET, COATED ORAL
Qty: 6 | Refills: 0 | Status: COMPLETED | COMMUNITY
Start: 2021-06-18 | End: 2021-07-06

## 2021-07-06 NOTE — PHYSICAL EXAM
[No Acute Distress] : no acute distress [Well-Appearing] : well-appearing [No Respiratory Distress] : no respiratory distress  [Clear to Auscultation] : lungs were clear to auscultation bilaterally [Normal Rate] : normal rate  [Regular Rhythm] : with a regular rhythm [Normal S1, S2] : normal S1 and S2 [External Female Genitalia] : normal external genitalia

## 2021-07-06 NOTE — REVIEW OF SYSTEMS
[Fever] : no fever [Chills] : no chills [Abdominal Pain] : no abdominal pain [Nausea] : no nausea [Vomiting] : no vomiting [Dysuria] : no dysuria [Hematuria] : no hematuria [Vaginal Discharge] : no vaginal discharge [Itching] : no itching [Skin Rash] : no skin rash [FreeTextEntry8] : R labial nodule [de-identified] : l

## 2021-12-01 ENCOUNTER — APPOINTMENT (OUTPATIENT)
Dept: FAMILY MEDICINE | Facility: HOSPITAL | Age: 41
End: 2021-12-01

## 2021-12-01 ENCOUNTER — OUTPATIENT (OUTPATIENT)
Dept: OUTPATIENT SERVICES | Facility: HOSPITAL | Age: 41
LOS: 1 days | End: 2021-12-01
Payer: SELF-PAY

## 2021-12-01 VITALS
SYSTOLIC BLOOD PRESSURE: 97 MMHG | TEMPERATURE: 97.6 F | DIASTOLIC BLOOD PRESSURE: 69 MMHG | HEART RATE: 87 BPM | OXYGEN SATURATION: 99 % | RESPIRATION RATE: 14 BRPM | BODY MASS INDEX: 1237.43 KG/M2 | WEIGHT: 176 LBS

## 2021-12-01 DIAGNOSIS — Z00.00 ENCOUNTER FOR GENERAL ADULT MEDICAL EXAMINATION WITHOUT ABNORMAL FINDINGS: ICD-10-CM

## 2021-12-01 PROCEDURE — 86735 MUMPS ANTIBODY: CPT

## 2021-12-01 PROCEDURE — 86762 RUBELLA ANTIBODY: CPT

## 2021-12-01 PROCEDURE — 86765 RUBEOLA ANTIBODY: CPT

## 2021-12-01 PROCEDURE — G0463: CPT

## 2021-12-01 PROCEDURE — 86480 TB TEST CELL IMMUN MEASURE: CPT

## 2021-12-03 LAB
MEV IGG FLD QL IA: 96.3 AU/ML
MEV IGG+IGM SER-IMP: POSITIVE
MUV AB SER-ACNC: POSITIVE
MUV IGG SER QL IA: 65.6 AU/ML
RUBV IGG FLD-ACNC: 7.2 INDEX
RUBV IGG SER-IMP: POSITIVE

## 2021-12-05 DIAGNOSIS — Z02.1 ENCOUNTER FOR PRE-EMPLOYMENT EXAMINATION: ICD-10-CM

## 2021-12-05 DIAGNOSIS — Z23 ENCOUNTER FOR IMMUNIZATION: ICD-10-CM

## 2021-12-08 ENCOUNTER — OUTPATIENT (OUTPATIENT)
Dept: OUTPATIENT SERVICES | Facility: HOSPITAL | Age: 41
LOS: 1 days | End: 2021-12-08
Payer: SELF-PAY

## 2021-12-08 ENCOUNTER — APPOINTMENT (OUTPATIENT)
Dept: FAMILY MEDICINE | Facility: HOSPITAL | Age: 41
End: 2021-12-08

## 2021-12-08 VITALS
DIASTOLIC BLOOD PRESSURE: 69 MMHG | OXYGEN SATURATION: 97 % | SYSTOLIC BLOOD PRESSURE: 100 MMHG | BODY MASS INDEX: 1258.51 KG/M2 | TEMPERATURE: 97.2 F | WEIGHT: 179 LBS | HEART RATE: 75 BPM | RESPIRATION RATE: 14 BRPM

## 2021-12-08 DIAGNOSIS — Z00.00 ENCOUNTER FOR GENERAL ADULT MEDICAL EXAMINATION WITHOUT ABNORMAL FINDINGS: ICD-10-CM

## 2021-12-08 DIAGNOSIS — Z02.89 ENCOUNTER FOR OTHER ADMINISTRATIVE EXAMINATIONS: ICD-10-CM

## 2021-12-08 LAB
M TB IFN-G BLD-IMP: NEGATIVE
QUANTIFERON TB PLUS MITOGEN MINUS NIL: 6.88 IU/ML
QUANTIFERON TB PLUS NIL: 0.02 IU/ML
QUANTIFERON TB PLUS TB1 MINUS NIL: -0.01 IU/ML
QUANTIFERON TB PLUS TB2 MINUS NIL: 0 IU/ML

## 2021-12-08 PROCEDURE — G0463: CPT

## 2021-12-08 NOTE — HISTORY OF PRESENT ILLNESS
[FreeTextEntry1] : CPE  [de-identified] : Patient is a 40 y/o F with a PMH of obesity here for CPE. Patient reports feeling well. She would like to have forms filled out for her work. No acute complaints at this time. Would like to have the flu shot.

## 2021-12-08 NOTE — PLAN
[FreeTextEntry1] : #Pre-employment exam \par - Complete physical exam performed today \par - Ordered necessary titers as well as Quantiferon gold \par \par #HCM\par - Flu shot received today 12/1/21\par \par #Cervical Cancer Screening \par - Last Pap Dec 2019 WNL \par \par Patient to RTC for lab results and to fill out forms. \par Above discussed with Dr. Maguire.

## 2021-12-08 NOTE — PHYSICAL EXAM
[No Acute Distress] : no acute distress [Well Nourished] : well nourished [Well Developed] : well developed [Well-Appearing] : well-appearing [No Respiratory Distress] : no respiratory distress  [No Accessory Muscle Use] : no accessory muscle use [Clear to Auscultation] : lungs were clear to auscultation bilaterally [Normal Rate] : normal rate  [Regular Rhythm] : with a regular rhythm [Normal S1, S2] : normal S1 and S2 [No Murmur] : no murmur heard [Normal Gait] : normal gait

## 2021-12-08 NOTE — HEALTH RISK ASSESSMENT
[Good] : ~his/her~  mood as  good [No] : No [No falls in past year] : Patient reported no falls in the past year [0] : 2) Feeling down, depressed, or hopeless: Not at all (0) [Employed] : employed [Fully functional (bathing, dressing, toileting, transferring, walking, feeding)] : Fully functional (bathing, dressing, toileting, transferring, walking, feeding) [Fully functional (using the telephone, shopping, preparing meals, housekeeping, doing laundry, using] : Fully functional and needs no help or supervision to perform IADLs (using the telephone, shopping, preparing meals, housekeeping, doing laundry, using transportation, managing medications and managing finances) [] : No [de-identified] : none [de-identified] : none

## 2021-12-08 NOTE — HISTORY OF PRESENT ILLNESS
[FreeTextEntry1] : completion of forms for employment  [de-identified] : Patient is a 40 y/o F with a PMH of obesity here to have forms filled out for work. No new complaints since she was last seen on 12/01/21. Titers for MMR and Quantiferon gold results reviewed with the patient. Flu shot received 12/01/21.

## 2021-12-12 DIAGNOSIS — Z02.89 ENCOUNTER FOR OTHER ADMINISTRATIVE EXAMINATIONS: ICD-10-CM

## 2022-08-12 ENCOUNTER — EMERGENCY (EMERGENCY)
Facility: HOSPITAL | Age: 42
LOS: 1 days | Discharge: HOME | End: 2022-08-12
Attending: INTERNAL MEDICINE | Admitting: INTERNAL MEDICINE
Payer: MEDICAID

## 2022-08-12 VITALS
DIASTOLIC BLOOD PRESSURE: 72 MMHG | SYSTOLIC BLOOD PRESSURE: 110 MMHG | HEIGHT: 59 IN | RESPIRATION RATE: 16 BRPM | TEMPERATURE: 98 F | WEIGHT: 175.27 LBS | OXYGEN SATURATION: 99 % | HEART RATE: 80 BPM

## 2022-08-12 PROCEDURE — 87591 N.GONORRHOEAE DNA AMP PROB: CPT

## 2022-08-12 PROCEDURE — 87491 CHLMYD TRACH DNA AMP PROBE: CPT

## 2022-08-12 PROCEDURE — 99284 EMERGENCY DEPT VISIT MOD MDM: CPT

## 2022-08-12 RX ORDER — CEFTRIAXONE 500 MG/1
500 INJECTION, POWDER, FOR SOLUTION INTRAMUSCULAR; INTRAVENOUS ONCE
Refills: 0 | Status: COMPLETED | OUTPATIENT
Start: 2022-08-12 | End: 2022-08-12

## 2022-08-12 RX ADMIN — Medication 100 MILLIGRAM(S): at 09:57

## 2022-08-12 RX ADMIN — CEFTRIAXONE 500 MILLIGRAM(S): 500 INJECTION, POWDER, FOR SOLUTION INTRAMUSCULAR; INTRAVENOUS at 09:57

## 2022-08-12 NOTE — ED PROVIDER NOTE - NSFOLLOWUPINSTRUCTIONS_ED_ALL_ED_FT
Follow up with gynecologist Dr. Jackson, call for an appointment     Please fill the prescription for the antibiotics and take as directed.  Please finish the entire course of medication as prescribed.  If you have any belly pain after the antibiotics, yogurt has been shown to help with this.  Do not use any alcohol or grapefruit juice with any antibiotics.    Stay hydrated    Return to the ER if your symptoms worsen or for any other medical emergencies  ************

## 2022-08-12 NOTE — ED PROVIDER NOTE - CARE PROVIDER_API CALL
Ronal Jackson)  Obstetrics and Gynecology  [address] [facility] [address], [address]  [place] [address]  Phone: [phone]  Fax: [phone]  Follow Up Time:

## 2022-08-12 NOTE — ED PROVIDER NOTE - CLINICAL SUMMARY MEDICAL DECISION MAKING FREE TEXT BOX
41 y/o F presents for vaginal pain x 1 week. Pt was seen for similar ~2 months ago in the ED, was dc'd with abx and had it drained in the FP clinic. Pt denies f/c, urinary sympts, f/c, abd pain, n/v, vaginal dc or vaginal bleeding. PE as noted above. will treat with abx for cellulitis with possibly early abscess. Will dc and place in [facility] f/u book for gyn appt     PCP- FP clinic

## 2022-08-12 NOTE — ED ADULT TRIAGE NOTE - AS HEIGHT TYPE
May 22, 2017      Jesus Alberto Biggs MD  [address] [place] [address] Bld  [address]  Cardiology Center  [place] [address]           [address] - Arrhythmia  [address]  [facility] [address]  Phone: [phone]  Fax: [phone]          Patient: Loreta M Damico   MR Number: [id number]   YOB: 1936   Date of Visit: 5/22/2017       Dear Dr. Jesus Alberto Biggs:    Thank you for referring Loreta Damico to me for evaluation. Attached you will find relevant portions of my assessment and plan of care.    If you have questions, please do not hesitate to call me. I look forward to following Loreta Damico along with you.    Sincerely,    Justus Church MD    Enclosure  CC:  No Recipients    If you would like to receive this communication electronically, please contact [email or web address] or [phone] to request more information on [facility] access.    For providers and/or their staff who would like to refer a patient to [facility], please contact us through our one-stop-shop provider referral line, [facility], at [phone].    If you feel you have received this communication in error or would no longer like to receive these types of communications, please e-mail [email or web address]         
stated

## 2022-08-12 NOTE — ED PROVIDER NOTE - NSFOLLOWUPCLINICS_GEN_ALL_ED_FT
Family Practice Clinic  Family Medicine  [address] [place] [address]  Phone: [phone]  Fax:

## 2022-08-12 NOTE — ED PROVIDER NOTE - PATIENT PORTAL LINK FT
You can access the FollowMyHealth Patient Portal offered by [facility] by registering at the following website: [email or web address]. By joining [employer]’s FollowMyHealth portal, you will also be able to view your health information using other applications (apps) compatible with our system.

## 2022-08-12 NOTE — ED PROVIDER NOTE - ATTENDING APP SHARED VISIT CONTRIBUTION OF CARE
43 y/o F presents for vaginal pain x 1 week. Pt was seen for similar ~2 months ago in the ED, was dc'd with abx and had it drained in the FP clinic. Pt denies f/c, urinary sympts, f/c, abd pain, n/v, vaginal dc or vaginal bleeding. PE as noted above. will treat with abx for cellulitis with possibly early abscess. Will dc and place in [facility] f/u book for gyn appt     PCP- FP clinic  Dr. Montanez:  I have reviewed and discussed with the PA/ resident the case specifics, including the history, physical assessment, evaluation, conclusion, laboratory results, and medical plan. I agree with the contents, and conclusions. I have personally examined, and interviewed the patient.

## 2022-08-12 NOTE — ED PROVIDER NOTE - NS ED ATTENDING STATEMENT MOD
This was a shared visit with the CHARLES. I reviewed and verified the documentation and independently performed the documented:

## 2022-08-12 NOTE — ED PROVIDER NOTE - OBJECTIVE STATEMENT
43 y/o F presents for vaginal pain x 1 week. Pt was seen for similar ~2 months ago in the ED, was dc'd with abx and had it drained in the FP clinic. Pt denies f/c, urinary sympts, f/c, abd pain, n/v, vaginal dc or vaginal bleeding

## 2022-08-13 ENCOUNTER — EMERGENCY (EMERGENCY)
Facility: HOSPITAL | Age: 42
LOS: 1 days | Discharge: HOME | End: 2022-08-13
Attending: INTERNAL MEDICINE | Admitting: INTERNAL MEDICINE
Payer: MEDICAID

## 2022-08-13 VITALS
HEIGHT: 59 IN | RESPIRATION RATE: 16 BRPM | WEIGHT: 177.91 LBS | HEART RATE: 85 BPM | DIASTOLIC BLOOD PRESSURE: 67 MMHG | SYSTOLIC BLOOD PRESSURE: 103 MMHG | TEMPERATURE: 99 F

## 2022-08-13 LAB
C TRACH RRNA SPEC QL NAA+PROBE: SIGNIFICANT CHANGE UP
N GONORRHOEA RRNA SPEC QL NAA+PROBE: SIGNIFICANT CHANGE UP

## 2022-08-13 PROCEDURE — 87077 CULTURE AEROBIC IDENTIFY: CPT

## 2022-08-13 PROCEDURE — 56405 I&D VULVA/PERINEAL ABSCESS: CPT

## 2022-08-13 PROCEDURE — 99284 EMERGENCY DEPT VISIT MOD MDM: CPT | Mod: 25

## 2022-08-13 PROCEDURE — 87070 CULTURE OTHR SPECIMN AEROBIC: CPT

## 2022-08-13 PROCEDURE — 87205 SMEAR GRAM STAIN: CPT

## 2022-08-13 PROCEDURE — 99283 EMERGENCY DEPT VISIT LOW MDM: CPT | Mod: 25

## 2022-08-13 PROCEDURE — 96372 THER/PROPH/DIAG INJ SC/IM: CPT | Mod: XU

## 2022-08-13 RX ORDER — KETOROLAC TROMETHAMINE 30 MG/ML
30 SYRINGE (ML) INJECTION ONCE
Refills: 0 | Status: DISCONTINUED | OUTPATIENT
Start: 2022-08-13 | End: 2022-08-13

## 2022-08-13 RX ADMIN — Medication 30 MILLIGRAM(S): at 18:45

## 2022-08-13 NOTE — ED PROVIDER NOTE - NSFOLLOWUPCLINICS_GEN_ALL_ED_FT
Family Practice Clinic  Family Medicine  [address] [place] [address]  Phone: [phone]  Fax:

## 2022-08-13 NOTE — ED PROVIDER NOTE - PATIENT PORTAL LINK FT
You can access the FollowMyHealth Patient Portal offered by [facility] by registering at the following website: [email or web address]. By joining [employer]’s FollowMyHealth portal, you will also be able to view your health information using other applications (apps) compatible with our system.

## 2022-08-13 NOTE — ED PROVIDER NOTE - NSFOLLOWUPINSTRUCTIONS_ED_ALL_ED_FT
Follow up with GYN Dr. Jackson in 2 days for wound recheck and packing change in the family practice clinic    Keep covered and dry.      Continue the antibiotics prescribed yesterday.      Take Motrin 600mg every 6hrs and Tylenol 650mg every 4 hours for pain. Take with food as needed for pain.     Any increased pain, redness, fever, chills or any new concerning symptoms return to the emergency department.  *****************    Abscess    An abscess is an infected area that contains a collection of pus and debris. It can occur in almost any part of the body and occurs when the tissue gets infection. Symptoms include a painful mass that is red, warm, tender that might break open and HAVE drainage. If your health care provider gave you antibiotics make sure to take the full course and do not stop even if feeling better.     SEEK IMMEDIATE MEDICAL CARE IF YOU HAVE ANY OF THE FOLLOWING SYMPTOMS: chills, fever, muscle aches, or red streaking from the area.

## 2022-08-13 NOTE — ED PROVIDER NOTE - OBJECTIVE STATEMENT
41 y/o F presents for vaginal pain x 1 week. Pt seen in the ED yesterday for same, was dx with vulvar cellulitis/abscess and dc'd with abx. Pt reports applyin warm soaks yesterday and now the pain is worsened.  Pt denies f/c, urinary sympts, f/c, abd pain, n/v, vaginal dc or vaginal bleeding

## 2022-08-13 NOTE — ED PROVIDER NOTE - CLINICAL SUMMARY MEDICAL DECISION MAKING FREE TEXT BOX
41 y/o F presents for vaginal pain x 1 week. Pt seen in the ED yesterday for same, was dx with vulvar cellulitis/abscess and dc'd with abx. Pt reports applying warm soaks yesterday and now the pain is worsened.  Pt denies f/c, urinary sympts, f/c, abd pain, n/v, vaginal dc or vaginal bleeding. PE as noted above. Abscess was I&D at bedside with Dr. Montanez. Spoke with Dr. Jackson, he will f/u with patient on monday.

## 2022-08-13 NOTE — ED ADULT NURSE NOTE - OBJECTIVE STATEMENT
Assumed pt care for a 42 yr old female alert and oriented x4, complaining of vaginal pain. Pt reports she was seen in the ED yesterday and prescribed antibiotics however the pain continued despite Tylenol. Pt reports pain is a sharp pain to vaginal area non radiant. Denies any bleeding.

## 2022-08-13 NOTE — ED PROVIDER NOTE - ATTENDING APP SHARED VISIT CONTRIBUTION OF CARE
41 y/o F presents for vaginal pain x 1 week. Pt seen in the ED yesterday for same, was dx with vulvar cellulitis/abscess and dc'd with abx. Pt reports applying warm soaks yesterday and now the pain is worsened.  Pt denies f/c, urinary sympts, f/c, abd pain, n/v, vaginal dc or vaginal bleeding. PE as noted above. Abscess was I&D at bedside Spoke with Dr. Jackson, he will f/u with patient on monday.  Dr. Montanez:  I have reviewed and discussed with the PA/ resident the case specifics, including the history, physical assessment, evaluation, conclusion, laboratory results, and medical plan. I agree with the contents, and conclusions. I have personally examined, and interviewed the patient.

## 2022-08-14 ENCOUNTER — NON-APPOINTMENT (OUTPATIENT)
Age: 42
End: 2022-08-14

## 2022-08-15 ENCOUNTER — OUTPATIENT (OUTPATIENT)
Dept: OUTPATIENT SERVICES | Facility: HOSPITAL | Age: 42
LOS: 1 days | End: 2022-08-15
Payer: SELF-PAY

## 2022-08-15 ENCOUNTER — APPOINTMENT (OUTPATIENT)
Dept: FAMILY MEDICINE | Facility: HOSPITAL | Age: 42
End: 2022-08-15

## 2022-08-15 VITALS
WEIGHT: 181 LBS | HEART RATE: 80 BPM | DIASTOLIC BLOOD PRESSURE: 81 MMHG | BODY MASS INDEX: 35.53 KG/M2 | TEMPERATURE: 97.6 F | OXYGEN SATURATION: 98 % | SYSTOLIC BLOOD PRESSURE: 116 MMHG | RESPIRATION RATE: 14 BRPM | HEIGHT: 60 IN

## 2022-08-15 DIAGNOSIS — Z98.890 OTHER SPECIFIED POSTPROCEDURAL STATES: ICD-10-CM

## 2022-08-15 DIAGNOSIS — Z00.00 ENCOUNTER FOR GENERAL ADULT MEDICAL EXAMINATION WITHOUT ABNORMAL FINDINGS: ICD-10-CM

## 2022-08-15 PROCEDURE — G0463: CPT

## 2022-08-16 DIAGNOSIS — N76.4 ABSCESS OF VULVA: ICD-10-CM

## 2022-08-16 NOTE — HISTORY OF PRESENT ILLNESS
[FreeTextEntry8] : 43 YO F with a PMH of obesity, batholin cyst, presents for post ER discharge. Patient was last diagnosed with A bartholin cyst in 2021, and was treated with antibiotics and I+D. Patient went to the ED on 8/13 for a similar reason on the Left lower gland. Patient had I+D, and was prescribed 7 days of Doxycycline and Tylenol for pain medications. Patient was told to return to clinic for follow up. Patient states that her pain has improved however still has discomfort while walking. Patient advised that if this happens again she will need further workup and marsupialization. She has no vaginal discharge, bleeding. Patient denies any fever, chills, chest pain, shortness of breath, nausea, vomiting, headache, cough, leg pain dizziness or weakness. \par \par

## 2022-08-16 NOTE — PHYSICAL EXAM
[Normal] : soft, non-tender, non-distended, no masses palpated, no HSM and normal bowel sounds [Vulvar Mass ___ cm] : a [unfilled] ~Ucm vulvar mass [FreeTextEntry1] : Left lower vaginal I+D drainage noted. Stitch fell off this AM according to patient.

## 2022-08-16 NOTE — ASSESSMENT
[FreeTextEntry1] : Patient to return to clinic in 2 weeks for resolution of symptoms follow up \par

## 2022-08-18 LAB
CULTURE RESULTS: SIGNIFICANT CHANGE UP
SPECIMEN SOURCE: SIGNIFICANT CHANGE UP

## 2022-08-19 ENCOUNTER — OUTPATIENT (OUTPATIENT)
Dept: OUTPATIENT SERVICES | Facility: HOSPITAL | Age: 42
LOS: 1 days | End: 2022-08-19
Payer: SELF-PAY

## 2022-08-19 ENCOUNTER — RESULT CHARGE (OUTPATIENT)
Age: 42
End: 2022-08-19

## 2022-08-19 ENCOUNTER — APPOINTMENT (OUTPATIENT)
Dept: FAMILY MEDICINE | Facility: HOSPITAL | Age: 42
End: 2022-08-19

## 2022-08-19 ENCOUNTER — NON-APPOINTMENT (OUTPATIENT)
Age: 42
End: 2022-08-19

## 2022-08-19 VITALS
HEIGHT: 60 IN | HEART RATE: 67 BPM | RESPIRATION RATE: 16 BRPM | TEMPERATURE: 98.3 F | WEIGHT: 177 LBS | SYSTOLIC BLOOD PRESSURE: 114 MMHG | BODY MASS INDEX: 34.75 KG/M2 | OXYGEN SATURATION: 96 % | DIASTOLIC BLOOD PRESSURE: 74 MMHG

## 2022-08-19 DIAGNOSIS — R30.0 DYSURIA: ICD-10-CM

## 2022-08-19 DIAGNOSIS — Z00.00 ENCOUNTER FOR GENERAL ADULT MEDICAL EXAMINATION WITHOUT ABNORMAL FINDINGS: ICD-10-CM

## 2022-08-19 DIAGNOSIS — N76.4 ABSCESS OF VULVA: ICD-10-CM

## 2022-08-19 PROCEDURE — 87086 URINE CULTURE/COLONY COUNT: CPT

## 2022-08-19 PROCEDURE — 87186 SC STD MICRODIL/AGAR DIL: CPT

## 2022-08-19 PROCEDURE — 87070 CULTURE OTHR SPECIMN AEROBIC: CPT

## 2022-08-19 PROCEDURE — G0463: CPT

## 2022-08-19 PROCEDURE — 87077 CULTURE AEROBIC IDENTIFY: CPT

## 2022-08-19 NOTE — ED POST DISCHARGE NOTE - DETAILS
Patient placed on doxycycline; no sensitivities performed. patient to f/u with gyn on Monday- Dr. Jackson per notes.

## 2022-08-23 LAB
BACTERIA SPEC CULT: ABNORMAL
BACTERIA UR CULT: NORMAL

## 2022-08-23 NOTE — PHYSICAL EXAM
[Vulvar Mass ___ cm] : a [unfilled] ~Ucm vulvar mass [Normal] : normal sclera/conjunctiva, pupils are equal, round and reactive to light and extraocular movements are intact [Urethral Meatus] : normal urethra [Urinary Bladder Findings] : the bladder was normal on palpation [External Female Genitalia] : normal external genitalia [Vagina] : normal vaginal exam [de-identified] : 3 X 5 cm mass palpated in the left posterior vulvar region  [FreeTextEntry1] : Left posterior vulvar mass with drainage of yellow serous fluid upon palpation

## 2022-08-23 NOTE — HISTORY OF PRESENT ILLNESS
[FreeTextEntry8] : 43 YO  Female with a PMH of obesity, Left vulvar cyst, presents for follow up. Patient was last diagnosed with A bartholin cyst in , and was treated with antibiotics and I+D. Patient went to the ED on  for a similar reason on the Left lower gland. Patient had I+D, and was prescribed 7 days of Doxycycline and Tylenol for pain medications. Patient followed up with Dr. Davis on 08/15/22 and reportedly had improvement in her symptoms of vulvar pain. At that time pt was advised that if it happens again, she will need further work up and a marsupialization procedure. \par \par 22 In today's visit, pt reports that she has completed the course for doxy yesterday however still has symptoms of vulvar pain albeit lower in intensity since last saturday. Pt reports dysuria and left posterior vulvar pain on sitting. has a mass in the left posterior aspect of the perineum in the left vulvar region. \par \par Bimanual exam performed in the Office. No motion tenderness of cervix. No foul vaginal Dc. mass palpated in the left posterior perineal region with expression of yellowish serous fluid from the incision site. \par She has no vaginal discharge, bleeding. pt complaining of dysuria. Patient denies any suprapubic/flank tenderness, fever, chills, chest pain, shortness of breath, nausea, vomiting, headache, cough, leg pain dizziness or weakness. \par \par

## 2022-08-23 NOTE — REVIEW OF SYSTEMS
[Negative] : Neurological [Dysuria] : dysuria [Fever] : no fever [Chills] : no chills [Fatigue] : no fatigue [Night Sweats] : no night sweats [Incontinence] : no incontinence [Hematuria] : no hematuria [Vaginal Discharge] : no vaginal discharge [FreeTextEntry8] : Has left posterior vulvar pain

## 2022-09-02 ENCOUNTER — APPOINTMENT (OUTPATIENT)
Dept: FAMILY MEDICINE | Facility: HOSPITAL | Age: 42
End: 2022-09-02

## 2022-09-02 ENCOUNTER — RESULT CHARGE (OUTPATIENT)
Age: 42
End: 2022-09-02

## 2022-09-02 ENCOUNTER — OUTPATIENT (OUTPATIENT)
Dept: OUTPATIENT SERVICES | Facility: HOSPITAL | Age: 42
LOS: 1 days | End: 2022-09-02
Payer: SELF-PAY

## 2022-09-02 VITALS
RESPIRATION RATE: 16 BRPM | DIASTOLIC BLOOD PRESSURE: 75 MMHG | BODY MASS INDEX: 34.37 KG/M2 | OXYGEN SATURATION: 98 % | SYSTOLIC BLOOD PRESSURE: 104 MMHG | WEIGHT: 176 LBS | TEMPERATURE: 97.8 F | HEART RATE: 68 BPM

## 2022-09-02 DIAGNOSIS — Z00.00 ENCOUNTER FOR GENERAL ADULT MEDICAL EXAMINATION WITHOUT ABNORMAL FINDINGS: ICD-10-CM

## 2022-09-02 DIAGNOSIS — N92.6 IRREGULAR MENSTRUATION, UNSPECIFIED: ICD-10-CM

## 2022-09-02 LAB
HCG UR QL: NEGATIVE
QUALITY CONTROL: YES

## 2022-09-02 PROCEDURE — G0463: CPT

## 2022-09-02 RX ORDER — AMOXICILLIN AND CLAVULANATE POTASSIUM 875; 125 MG/1; MG/1
875-125 TABLET, COATED ORAL
Qty: 20 | Refills: 0 | Status: DISCONTINUED | COMMUNITY
Start: 2022-08-19 | End: 2022-09-02

## 2022-09-02 NOTE — ASSESSMENT
[FreeTextEntry1] : - Healthcare maintenance\par       Pt should return to care in 3 months for her annual

## 2022-09-02 NOTE — PHYSICAL EXAM
[Normal] : soft, non-tender, non-distended, no masses palpated, no HSM and normal bowel sounds [Urethral Meatus] : normal urethra [External Female Genitalia] : normal external genitalia [Vagina] : normal vaginal exam [Anus Abnormality] : the anus and perineum were normal [Vulvar Mass ___ cm] : no vulvar mass [de-identified] : Resolution of the mass that was palpated last time. No perineal/vulvar discharge

## 2022-09-02 NOTE — REVIEW OF SYSTEMS
[Dysuria] : dysuria [Negative] : Gastrointestinal [Fever] : no fever [Chills] : no chills [Fatigue] : no fatigue [Night Sweats] : no night sweats [Incontinence] : no incontinence [Hematuria] : no hematuria [Vaginal Discharge] : no vaginal discharge [FreeTextEntry8] : Has left posterior vulvar pain

## 2022-09-02 NOTE — HISTORY OF PRESENT ILLNESS
[FreeTextEntry1] : Vulvar abscess [de-identified] : 41 YO  Female with a PMH of obesity, Left vulvar cyst, presents for follow up. Pt had another episode of vulvar abscess in . Patient went to the ED on  for a similar reason on the Left lower posterior vulvar abscess which was drained, and was prescribed 7 days of Doxycycline and Tylenol for pain control. Patient followed up with Dr. Davis on 08/15/22 and at that time pt was advised that if it happens again, she will need further work up and a marsupialization procedure. pt returned to clinic on 22 and pt reported that she completed the course for doxy without resolution of her symptoms. Pt reported dysuria and left posterior vulvar pain on sitting- had a mass in the left posterior aspect of the perineum in the left vulvar region which expressed yellowish serous fluid on palpation. The abscess fluid was sent out for culture and pt was put on augmentin 875mg BID for 10 days. \par \par Pt returns to clinic today for a follow up expressing that her symptoms have completely resolved with slight discomfort and burning sensation still present on stretching of the vulvar region and and on urination. pt denied any fever, perineal pain, perineal mass, vulvar/vaginal discharge\par  [FreeTextEntry8] : \par \par \par

## 2022-09-06 DIAGNOSIS — N76.4 ABSCESS OF VULVA: ICD-10-CM

## 2022-09-06 DIAGNOSIS — N92.6 IRREGULAR MENSTRUATION, UNSPECIFIED: ICD-10-CM

## 2022-12-21 ENCOUNTER — APPOINTMENT (OUTPATIENT)
Dept: FAMILY MEDICINE | Facility: HOSPITAL | Age: 42
End: 2022-12-21

## 2022-12-21 ENCOUNTER — OUTPATIENT (OUTPATIENT)
Dept: OUTPATIENT SERVICES | Facility: HOSPITAL | Age: 42
LOS: 1 days | End: 2022-12-21
Payer: SELF-PAY

## 2022-12-21 ENCOUNTER — NON-APPOINTMENT (OUTPATIENT)
Age: 42
End: 2022-12-21

## 2022-12-21 VITALS
RESPIRATION RATE: 16 BRPM | HEART RATE: 65 BPM | TEMPERATURE: 98.6 F | WEIGHT: 176 LBS | HEIGHT: 60 IN | OXYGEN SATURATION: 97 % | DIASTOLIC BLOOD PRESSURE: 67 MMHG | SYSTOLIC BLOOD PRESSURE: 134 MMHG | BODY MASS INDEX: 34.55 KG/M2

## 2022-12-21 DIAGNOSIS — Z23 ENCOUNTER FOR IMMUNIZATION: ICD-10-CM

## 2022-12-21 DIAGNOSIS — Z12.39 ENCOUNTER FOR OTHER SCREENING FOR MALIGNANT NEOPLASM OF BREAST: ICD-10-CM

## 2022-12-21 DIAGNOSIS — Z00.00 ENCOUNTER FOR GENERAL ADULT MEDICAL EXAMINATION W/OUT ABNORMAL FINDINGS: ICD-10-CM

## 2022-12-21 DIAGNOSIS — Z00.00 ENCOUNTER FOR GENERAL ADULT MEDICAL EXAMINATION WITHOUT ABNORMAL FINDINGS: ICD-10-CM

## 2022-12-21 DIAGNOSIS — Z11.1 ENCOUNTER FOR SCREENING FOR RESPIRATORY TUBERCULOSIS: ICD-10-CM

## 2022-12-21 DIAGNOSIS — Z02.1 ENCOUNTER FOR PRE-EMPLOYMENT EXAMINATION: ICD-10-CM

## 2022-12-21 PROCEDURE — 80061 LIPID PANEL: CPT

## 2022-12-21 PROCEDURE — 86762 RUBELLA ANTIBODY: CPT

## 2022-12-21 PROCEDURE — 86480 TB TEST CELL IMMUN MEASURE: CPT

## 2022-12-21 PROCEDURE — 80053 COMPREHEN METABOLIC PANEL: CPT

## 2022-12-21 PROCEDURE — 86735 MUMPS ANTIBODY: CPT

## 2022-12-21 PROCEDURE — 82306 VITAMIN D 25 HYDROXY: CPT

## 2022-12-21 PROCEDURE — 83036 HEMOGLOBIN GLYCOSYLATED A1C: CPT

## 2022-12-21 PROCEDURE — 86787 VARICELLA-ZOSTER ANTIBODY: CPT

## 2022-12-21 PROCEDURE — 85025 COMPLETE CBC W/AUTO DIFF WBC: CPT

## 2022-12-21 PROCEDURE — G0463: CPT

## 2022-12-21 NOTE — PHYSICAL EXAM
[No Acute Distress] : no acute distress [Well Nourished] : well nourished [Well Developed] : well developed [Well-Appearing] : well-appearing [Normal Sclera/Conjunctiva] : normal sclera/conjunctiva [PERRL] : pupils equal round and reactive to light [EOMI] : extraocular movements intact [No Respiratory Distress] : no respiratory distress  [No Accessory Muscle Use] : no accessory muscle use [Clear to Auscultation] : lungs were clear to auscultation bilaterally [Normal Rate] : normal rate  [Regular Rhythm] : with a regular rhythm [Normal S1, S2] : normal S1 and S2 [No Murmur] : no murmur heard [Soft] : abdomen soft [Non Tender] : non-tender [Non-distended] : non-distended [Normal Bowel Sounds] : normal bowel sounds [No Rash] : no rash [Coordination Grossly Intact] : coordination grossly intact [Normal Affect] : the affect was normal [Normal Insight/Judgement] : insight and judgment were intact

## 2022-12-23 DIAGNOSIS — Z02.1 ENCOUNTER FOR PRE-EMPLOYMENT EXAMINATION: ICD-10-CM

## 2022-12-23 DIAGNOSIS — R79.89 OTHER SPECIFIED ABNORMAL FINDINGS OF BLOOD CHEMISTRY: ICD-10-CM

## 2022-12-23 LAB
25(OH)D3 SERPL-MCNC: 24.8 NG/ML
ALBUMIN SERPL ELPH-MCNC: 4.1 G/DL
ALP BLD-CCNC: 88 U/L
ALT SERPL-CCNC: 14 U/L
ANION GAP SERPL CALC-SCNC: 9 MMOL/L
AST SERPL-CCNC: 14 U/L
BASOPHILS # BLD AUTO: 0.05 K/UL
BASOPHILS NFR BLD AUTO: 0.7 %
BILIRUB SERPL-MCNC: 0.2 MG/DL
BUN SERPL-MCNC: 7 MG/DL
CALCIUM SERPL-MCNC: 8.9 MG/DL
CHLORIDE SERPL-SCNC: 108 MMOL/L
CHOLEST SERPL-MCNC: 154 MG/DL
CO2 SERPL-SCNC: 24 MMOL/L
CREAT SERPL-MCNC: 0.49 MG/DL
EGFR: 121 ML/MIN/1.73M2
EOSINOPHIL # BLD AUTO: 0.2 K/UL
EOSINOPHIL NFR BLD AUTO: 2.9 %
ESTIMATED AVERAGE GLUCOSE: 114 MG/DL
GLUCOSE SERPL-MCNC: 82 MG/DL
HBA1C MFR BLD HPLC: 5.6 %
HCT VFR BLD CALC: 40.3 %
HDLC SERPL-MCNC: 44 MG/DL
HGB BLD-MCNC: 13 G/DL
IMM GRANULOCYTES NFR BLD AUTO: 0.4 %
LDLC SERPL CALC-MCNC: 94 MG/DL
LYMPHOCYTES # BLD AUTO: 1.09 K/UL
LYMPHOCYTES NFR BLD AUTO: 16 %
M TB IFN-G BLD-IMP: NEGATIVE
MAN DIFF?: NORMAL
MCHC RBC-ENTMCNC: 29.6 PG
MCHC RBC-ENTMCNC: 32.3 GM/DL
MCV RBC AUTO: 91.8 FL
MONOCYTES # BLD AUTO: 0.39 K/UL
MONOCYTES NFR BLD AUTO: 5.7 %
MUV AB SER-ACNC: POSITIVE
MUV IGG SER QL IA: 56 AU/ML
NEUTROPHILS # BLD AUTO: 5.06 K/UL
NEUTROPHILS NFR BLD AUTO: 74.3 %
NONHDLC SERPL-MCNC: 110 MG/DL
PLATELET # BLD AUTO: 286 K/UL
POTASSIUM SERPL-SCNC: 4.5 MMOL/L
PROT SERPL-MCNC: 6.6 G/DL
QUANTIFERON TB PLUS MITOGEN MINUS NIL: 1.23 IU/ML
QUANTIFERON TB PLUS NIL: 0.03 IU/ML
QUANTIFERON TB PLUS TB1 MINUS NIL: -0.01 IU/ML
QUANTIFERON TB PLUS TB2 MINUS NIL: 0 IU/ML
RBC # BLD: 4.39 M/UL
RBC # FLD: 14.1 %
RUBV IGG FLD-ACNC: 5.3 INDEX
RUBV IGG SER-IMP: POSITIVE
SODIUM SERPL-SCNC: 141 MMOL/L
TRIGL SERPL-MCNC: 82 MG/DL
VZV AB TITR SER: POSITIVE
VZV IGG SER IF-ACNC: 943.4 INDEX
WBC # FLD AUTO: 6.82 K/UL

## 2023-02-27 NOTE — HEALTH RISK ASSESSMENT
[Good] : ~his/her~  mood as  good [Never] : Never [No] : No [0] : 2) Feeling down, depressed, or hopeless: Not at all (0) [PHQ-2 Negative - No further assessment needed] : PHQ-2 Negative - No further assessment needed [With Family] : lives with family [# of Members in Household ___] :  household currently consist of [unfilled] member(s) [Employed] : employed [High School] : high school [Single] : single [# Of Children ___] : has [unfilled] children [Sexually Active] : sexually active [Feels Safe at Home] : Feels safe at home [Fully functional (bathing, dressing, toileting, transferring, walking, feeding)] : Fully functional (bathing, dressing, toileting, transferring, walking, feeding) [Fully functional (using the telephone, shopping, preparing meals, housekeeping, doing laundry, using] : Fully functional and needs no help or supervision to perform IADLs (using the telephone, shopping, preparing meals, housekeeping, doing laundry, using transportation, managing medications and managing finances) [de-identified] : running for 20 min, 3 times a week [de-identified] : avoid excessive carbohydrates, more vegatables  [[id number]] : 0 [Reports changes in hearing] : Reports no changes in hearing [Reports changes in vision] : Reports no changes in vision [Reports changes in dental health] : Reports no changes in dental health [de-identified] : with 3 kids, sister, sister's daughter, brother  [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

## 2023-02-27 NOTE — HISTORY OF PRESENT ILLNESS
[FreeTextEntry1] : CPE  [de-identified] : 43 y/o F with left vulvar cyst, obesity, vitamin D deficiency who is here for CPE. \par \par Reports no concerns.\par \par Denies headache, dizziness, chest pain, SOB, abdominal pain, constipation, diarrhea, edema, urinary issues, urogenital issues or other concerns.

## 2023-07-24 ENCOUNTER — APPOINTMENT (OUTPATIENT)
Dept: FAMILY MEDICINE | Facility: HOSPITAL | Age: 43
End: 2023-07-24

## 2023-07-24 ENCOUNTER — OUTPATIENT (OUTPATIENT)
Dept: OUTPATIENT SERVICES | Facility: HOSPITAL | Age: 43
LOS: 1 days | End: 2023-07-24
Payer: SELF-PAY

## 2023-07-24 VITALS
RESPIRATION RATE: 16 BRPM | HEART RATE: 80 BPM | SYSTOLIC BLOOD PRESSURE: 94 MMHG | OXYGEN SATURATION: 97 % | BODY MASS INDEX: 36.12 KG/M2 | HEIGHT: 60 IN | WEIGHT: 184 LBS | TEMPERATURE: 98.1 F | DIASTOLIC BLOOD PRESSURE: 62 MMHG

## 2023-07-24 DIAGNOSIS — R79.89 OTHER SPECIFIED ABNORMAL FINDINGS OF BLOOD CHEMISTRY: ICD-10-CM

## 2023-07-24 DIAGNOSIS — Z00.00 ENCOUNTER FOR GENERAL ADULT MEDICAL EXAMINATION WITHOUT ABNORMAL FINDINGS: ICD-10-CM

## 2023-07-24 DIAGNOSIS — E66.9 OBESITY, UNSPECIFIED: ICD-10-CM

## 2023-07-24 DIAGNOSIS — N76.4 ABSCESS OF VULVA: ICD-10-CM

## 2023-07-24 RX ORDER — AMOXICILLIN AND CLAVULANATE POTASSIUM 875; 125 MG/1; MG/1
875-125 TABLET, COATED ORAL
Qty: 28 | Refills: 0 | Status: ACTIVE | COMMUNITY
Start: 2023-07-24 | End: 1900-01-01

## 2023-07-25 NOTE — REVIEW OF SYSTEMS
[Negative] : Gastrointestinal [Dysuria] : no dysuria [Hematuria] : no hematuria [Vaginal Discharge] : no vaginal discharge [FreeTextEntry8] : Left vulvar mass, swelling, and pain but

## 2023-07-25 NOTE — HISTORY OF PRESENT ILLNESS
[FreeTextEntry8] : Pt is a 42 yo M PMH of recurrent Bartholin's cyst, obesity, vitamin D deficiency, presenting for recurrent Bartholin's cyst. Pt c/o 8 day hx of vulvar pain, no alleviating/aggravating factors, associated with area redness and swelling, no discharge yet, and denies fevers, chills, dysuria, bleeding. Pt also here for f/u obesity and vitamin D deficiency but has no new associated sxs. No other urgent sxs or complaints endorsed at this time.\par

## 2023-07-25 NOTE — PHYSICAL EXAM
[No Acute Distress] : no acute distress [Well-Appearing] : well-appearing [Normal Sclera/Conjunctiva] : normal sclera/conjunctiva [Normal Outer Ear/Nose] : the outer ears and nose were normal in appearance [Supple] : supple [No Respiratory Distress] : no respiratory distress  [No Accessory Muscle Use] : no accessory muscle use [Clear to Auscultation] : lungs were clear to auscultation bilaterally [Normal Rate] : normal rate  [Regular Rhythm] : with a regular rhythm [Urethral Meatus] : normal urethra [Vagina] : normal vaginal exam [de-identified] : Small 1-2 cm raised red mass noted on the lower aspect of the left labia

## 2023-07-31 PROCEDURE — 36415 COLL VENOUS BLD VENIPUNCTURE: CPT

## 2023-07-31 PROCEDURE — 82306 VITAMIN D 25 HYDROXY: CPT

## 2023-07-31 PROCEDURE — G0463: CPT

## 2023-08-03 ENCOUNTER — EMERGENCY (EMERGENCY)
Facility: HOSPITAL | Age: 43
LOS: 1 days | Discharge: HOME | End: 2023-08-03
Attending: EMERGENCY MEDICINE | Admitting: EMERGENCY MEDICINE
Payer: MEDICAID

## 2023-08-03 VITALS
WEIGHT: 184.09 LBS | HEIGHT: 59 IN | DIASTOLIC BLOOD PRESSURE: 79 MMHG | OXYGEN SATURATION: 98 % | HEART RATE: 77 BPM | TEMPERATURE: 98 F | RESPIRATION RATE: 18 BRPM | SYSTOLIC BLOOD PRESSURE: 112 MMHG

## 2023-08-03 PROCEDURE — 99283 EMERGENCY DEPT VISIT LOW MDM: CPT

## 2023-08-03 PROCEDURE — 99282 EMERGENCY DEPT VISIT SF MDM: CPT

## 2023-08-03 NOTE — ED ADULT NURSE NOTE - OBJECTIVE STATEMENT
Pt presents to ED with c/o vaginal abscess.  Reports pain.  Denies any fevers, urinary complaints, vaginal discharge.

## 2023-08-03 NOTE — ED PROVIDER NOTE - PATIENT PORTAL LINK FT
You can access the FollowMyHealth Patient Portal offered by [facility] by registering at the following website: [email or web address]. By joining [employer]’s FollowMyHealth portal, you will also be able to view your health information using other applications (apps) compatible with our system.

## 2023-08-03 NOTE — ED PROVIDER NOTE - OBJECTIVE STATEMENT
44yo female with abscess to vulva, states she was seen 2 days ago and was started on augmentin but the pain is worse no fever, chills, no other complaints

## 2023-08-03 NOTE — ED ADULT NURSE NOTE - NSFALLUNIVINTERV_ED_ALL_ED
Bed/Stretcher in lowest position, wheels locked, appropriate side rails in place/Call bell, personal items and telephone in reach/Instruct patient to call for assistance before getting out of bed/chair/stretcher/Non-slip footwear applied when patient is off stretcher/[place] to call system/Physically safe environment - no spills, clutter or unnecessary equipment/Purposeful proactive rounding/Room/bathroom lighting operational, light cord in reach

## 2023-08-03 NOTE — ED PROVIDER NOTE - DIFFERENTIAL DIAGNOSIS
Differential Diagnosis ddx considered but not limited to abscess, cellulitis, nabothian cyst, std, uti

## 2023-08-03 NOTE — ED ADULT TRIAGE NOTE - CHIEF COMPLAINT QUOTE
Pt c/o pain to left upper leg and vaginal pain. Pt states she had a cyst drained and it feels like its getting bigger and more painful. Pt RX amox-clav

## 2023-08-05 ENCOUNTER — APPOINTMENT (OUTPATIENT)
Dept: FAMILY MEDICINE | Facility: HOSPITAL | Age: 43
End: 2023-08-05

## 2023-08-05 ENCOUNTER — OUTPATIENT (OUTPATIENT)
Dept: OUTPATIENT SERVICES | Facility: HOSPITAL | Age: 43
LOS: 1 days | End: 2023-08-05
Payer: SELF-PAY

## 2023-08-05 VITALS
RESPIRATION RATE: 17 BRPM | WEIGHT: 188 LBS | HEART RATE: 76 BPM | SYSTOLIC BLOOD PRESSURE: 113 MMHG | OXYGEN SATURATION: 100 % | DIASTOLIC BLOOD PRESSURE: 76 MMHG | BODY MASS INDEX: 36.72 KG/M2

## 2023-08-05 DIAGNOSIS — L02.91 CUTANEOUS ABSCESS, UNSPECIFIED: ICD-10-CM

## 2023-08-05 DIAGNOSIS — R79.89 OTHER SPECIFIED ABNORMAL FINDINGS OF BLOOD CHEMISTRY: ICD-10-CM

## 2023-08-05 DIAGNOSIS — Z00.00 ENCOUNTER FOR GENERAL ADULT MEDICAL EXAMINATION WITHOUT ABNORMAL FINDINGS: ICD-10-CM

## 2023-08-05 PROCEDURE — G0463: CPT

## 2023-08-05 RX ORDER — CHOLECALCIFEROL (VITAMIN D3) 50 MCG
50 MCG TABLET ORAL
Qty: 60 | Refills: 1 | Status: DISCONTINUED | COMMUNITY
Start: 2022-09-02 | End: 2023-08-05

## 2023-08-05 RX ORDER — MUPIROCIN 20 MG/G
2 OINTMENT TOPICAL 3 TIMES DAILY
Qty: 1 | Refills: 0 | Status: ACTIVE | COMMUNITY
Start: 2023-08-05 | End: 1900-01-01

## 2023-08-05 RX ORDER — ERGOCALCIFEROL 1.25 MG/1
1.25 MG CAPSULE, LIQUID FILLED ORAL WEEKLY
Qty: 20 | Refills: 0 | Status: ACTIVE | COMMUNITY
Start: 2023-08-05 | End: 1900-01-01

## 2023-08-09 LAB — 25(OH)D3 SERPL-MCNC: 20.3 NG/ML

## 2023-08-10 ENCOUNTER — OUTPATIENT (OUTPATIENT)
Dept: OUTPATIENT SERVICES | Facility: HOSPITAL | Age: 43
LOS: 1 days | End: 2023-08-10
Payer: SELF-PAY

## 2023-08-10 ENCOUNTER — APPOINTMENT (OUTPATIENT)
Dept: FAMILY MEDICINE | Facility: HOSPITAL | Age: 43
End: 2023-08-10

## 2023-08-10 VITALS
HEART RATE: 81 BPM | TEMPERATURE: 97.8 F | OXYGEN SATURATION: 99 % | DIASTOLIC BLOOD PRESSURE: 69 MMHG | SYSTOLIC BLOOD PRESSURE: 102 MMHG | RESPIRATION RATE: 14 BRPM

## 2023-08-10 VITALS — WEIGHT: 188 LBS | BODY MASS INDEX: 36.72 KG/M2

## 2023-08-10 DIAGNOSIS — Z00.00 ENCOUNTER FOR GENERAL ADULT MEDICAL EXAMINATION WITHOUT ABNORMAL FINDINGS: ICD-10-CM

## 2023-08-10 DIAGNOSIS — L02.215 CUTANEOUS ABSCESS OF PERINEUM: ICD-10-CM

## 2023-08-10 PROCEDURE — G0463: CPT

## 2023-08-10 NOTE — HISTORY OF PRESENT ILLNESS
[de-identified] : Pt is a 42 yo M PMH of recurrent Bartholin's cyst, obesity, vitamin D deficiency, presenting for recurrent Bartholin's cyst. Pt said that she went to the ED recently and had I&d.Pt  denies fevers, chills, dysuria, bleeding. Pt also here for f/u obesity and vitamin D deficiency but has no new associated sxs. No other urgent sxs or complaints endorsed at this time.

## 2023-08-10 NOTE — PHYSICAL EXAM
[Normal] : soft, non-tender, non-distended, no masses palpated, no HSM and normal bowel sounds [de-identified] : draining abscess noted in the perineal area close to left labia majora- at 5 o clock.

## 2023-08-17 NOTE — END OF VISIT
[] : Resident [FreeTextEntry3] : Patient advised RE Tx options for recurrent Bartholins gland abscess

## 2023-08-17 NOTE — HISTORY OF PRESENT ILLNESS
[FreeTextEntry1] : f/u perineal abscess [de-identified] : Pt is a 44 yo M PMH of recurrent Bartholin's cyst, obesity, vitamin D deficiency, presenting for recurrent Bartholin's cyst. Pt said that she went to the ED 8/3/2023 and had I&D.  Pt finished 14 days of Augmentin prescribed from previous office visit 7/24/2023 before ED visit and continue the mupirocin cream previous from last visit on 8/5/2023.  Reports feeling better, denies vaginal bleeding, says could still feel the bump, but decrease in size, 5 to 10 times smaller.   Denies fever, chills, headache, chest pain, SOB, abdominal pain, diarrhea, urinary issues or other concerns.

## 2023-08-17 NOTE — PHYSICAL EXAM
[No Acute Distress] : no acute distress [Well Developed] : well developed [Normal Sclera/Conjunctiva] : normal sclera/conjunctiva [EOMI] : extraocular movements intact [No Respiratory Distress] : no respiratory distress  [No Accessory Muscle Use] : no accessory muscle use [Clear to Auscultation] : lungs were clear to auscultation bilaterally [Normal Rate] : normal rate  [Regular Rhythm] : with a regular rhythm [Normal S1, S2] : normal S1 and S2 [No Murmur] : no murmur heard [No Edema] : there was no peripheral edema [Soft] : abdomen soft [Non Tender] : non-tender [Non-distended] : non-distended [Normal Bowel Sounds] : normal bowel sounds [de-identified] : chaperone: nurseChastity [FreeTextEntry1] : bartholin cyst on the left side visulized, resolving

## 2023-08-25 NOTE — ED PROVIDER NOTE - WET READ LAUNCH FT
Recorded Pressure: Ao, HR=96, Condition=Condition 1 (Aorta) Ao 153/89/114 There are no Wet Read(s) to document.

## 2023-11-02 NOTE — ED PROVIDER NOTE - CCCP TRG CHIEF CMPLNT
----- Message from Kala Chaves MD sent at 11/1/2023  8:10 PM CDT -----  Please notify the patient of results.  No mammographic evidence for malignancy. Routine mammogram screening recommended.    Follow-up as planned.  
Result sent to patients live [facility].     
genital pain

## 2024-04-02 NOTE — ED ADULT NURSE NOTE - DRUG PRE-SCREENING (DAST -1)
Spinal Block    Date/Time: 4/2/2024 8:36 AM    Performed by: Shawnee Boyer MD  Authorized by: Shawnee Boyer MD    Patient Location:  L&D  Indication: primary anesthetic    Pre anesthesia checklist Patient Identified (2 criteria), Timeout Performed, Resuscitation equipment available, IV Bolus, Allergies confirmed, Monitors applied, Coagulation Status, Block site marked (if applicable), Sedation given (if needed), Block Plan Confirmed, Drugs/Solutions Labeled, IV Access functioning, Necessary Block Equipment Present, Consent Verified and Aseptic technique   Patient Position:  Sitting  Prep:  Providone iodine  Max Sterile Barrier Technique:  Hand Washing, Cap/Mask, Sterile gloves and Sterile drape  Monitoring:  Blood pressure, continuous pulse ox, FHT and NIBP  Oxygen Supplement:  Room Air  Approach:  Midline  Local Infiltration:  1% Lidocaine  Location:  L3-4  Injection Technique:  Single-shot  Needle Type:  Pencil-tip  Needle Gauge:  24 G  Needle Length:  9 cm  Catheter Type:  Open end    Assessment:    Block Outcome: block to be assessed in the OR  Number of Attempts: 1   Events: CSF from needle   Procedure Assessment: patient tolerated procedure well with no complications   SENSORY LEVEL: T3.  Start Time:  4/2/2024 8:36 AM  Stop Time: 4/2/2024 8:40 AM Statement Selected

## 2024-06-20 ENCOUNTER — OUTPATIENT (OUTPATIENT)
Dept: OUTPATIENT SERVICES | Facility: HOSPITAL | Age: 44
LOS: 1 days | End: 2024-06-20
Payer: SELF-PAY

## 2024-06-20 ENCOUNTER — APPOINTMENT (OUTPATIENT)
Dept: FAMILY MEDICINE | Facility: HOSPITAL | Age: 44
End: 2024-06-20

## 2024-06-20 ENCOUNTER — OUTPATIENT (OUTPATIENT)
Dept: OUTPATIENT SERVICES | Facility: HOSPITAL | Age: 44
LOS: 1 days | End: 2024-06-20
Payer: COMMERCIAL

## 2024-06-20 VITALS
WEIGHT: 188 LBS | OXYGEN SATURATION: 99 % | HEART RATE: 80 BPM | TEMPERATURE: 97.6 F | RESPIRATION RATE: 15 BRPM | BODY MASS INDEX: 36.72 KG/M2 | SYSTOLIC BLOOD PRESSURE: 118 MMHG | DIASTOLIC BLOOD PRESSURE: 77 MMHG

## 2024-06-20 DIAGNOSIS — Z12.4 ENCOUNTER FOR SCREENING FOR MALIGNANT NEOPLASM OF CERVIX: ICD-10-CM

## 2024-06-20 DIAGNOSIS — M25.561 PAIN IN RIGHT KNEE: ICD-10-CM

## 2024-06-20 DIAGNOSIS — Z00.00 ENCOUNTER FOR GENERAL ADULT MEDICAL EXAMINATION WITHOUT ABNORMAL FINDINGS: ICD-10-CM

## 2024-06-20 DIAGNOSIS — Z12.31 ENCOUNTER FOR SCREENING MAMMOGRAM FOR MALIGNANT NEOPLASM OF BREAST: ICD-10-CM

## 2024-06-20 DIAGNOSIS — Z12.11 ENCOUNTER FOR SCREENING FOR MALIGNANT NEOPLASM OF COLON: ICD-10-CM

## 2024-06-20 PROCEDURE — 84100 ASSAY OF PHOSPHORUS: CPT

## 2024-06-20 PROCEDURE — 80053 COMPREHEN METABOLIC PANEL: CPT

## 2024-06-20 PROCEDURE — G0463: CPT

## 2024-06-20 PROCEDURE — 36415 COLL VENOUS BLD VENIPUNCTURE: CPT

## 2024-06-20 PROCEDURE — 83735 ASSAY OF MAGNESIUM: CPT

## 2024-06-20 PROCEDURE — 87624 HPV HI-RISK TYP POOLED RSLT: CPT

## 2024-06-20 PROCEDURE — 82652 VIT D 1 25-DIHYDROXY: CPT

## 2024-06-20 NOTE — PHYSICAL EXAM
[No Acute Distress] : no acute distress [Well-Appearing] : well-appearing [Normal Sclera/Conjunctiva] : normal sclera/conjunctiva [EOMI] : extraocular movements intact [Normal Oropharynx] : the oropharynx was normal [No Edema] : there was no peripheral edema [No Extremity Clubbing/Cyanosis] : no extremity clubbing/cyanosis [Normal Appearance] : normal in appearance [No Masses] : no palpable masses [No Nipple Discharge] : no nipple discharge [No Axillary Lymphadenopathy] : no axillary lymphadenopathy [Grossly Normal Strength/Tone] : grossly normal strength/tone [Normal Gait] : normal gait [Normal] : affect was normal and insight and judgment were intact [de-identified] : varicose veins b/l legs, tender to palpation veins on right thigh [de-identified] : chaperoned by URSULA Quinn

## 2024-06-20 NOTE — PHYSICAL EXAM
[No Acute Distress] : no acute distress [Well-Appearing] : well-appearing [Normal Sclera/Conjunctiva] : normal sclera/conjunctiva [EOMI] : extraocular movements intact [Normal Oropharynx] : the oropharynx was normal [No Edema] : there was no peripheral edema [No Extremity Clubbing/Cyanosis] : no extremity clubbing/cyanosis [Normal Appearance] : normal in appearance [No Masses] : no palpable masses [No Nipple Discharge] : no nipple discharge [No Axillary Lymphadenopathy] : no axillary lymphadenopathy [Grossly Normal Strength/Tone] : grossly normal strength/tone [Normal Gait] : normal gait [Normal] : affect was normal and insight and judgment were intact [de-identified] : varicose veins b/l legs, tender to palpation veins on right thigh [de-identified] : chaperoned by URSULA Quinn

## 2024-06-20 NOTE — HISTORY OF PRESENT ILLNESS
[FreeTextEntry8] : 44F h/o recurrent Bartholin's cyst, obesity, vitamin D deficiency presents for CSP. Denies weakness/fatigue, unexplained weight loss. Denies FHx of cancer.

## 2024-06-21 LAB
24R-OH-CALCIDIOL SERPL-MCNC: 65.6 PG/ML
ALBUMIN SERPL ELPH-MCNC: 4 G/DL
ALP BLD-CCNC: 88 U/L
ALT SERPL-CCNC: 26 U/L
ANION GAP SERPL CALC-SCNC: 9 MMOL/L
AST SERPL-CCNC: 22 U/L
BILIRUB SERPL-MCNC: 0.2 MG/DL
BUN SERPL-MCNC: 9 MG/DL
CALCIUM SERPL-MCNC: 9 MG/DL
CHLORIDE SERPL-SCNC: 108 MMOL/L
CO2 SERPL-SCNC: 21 MMOL/L
CREAT SERPL-MCNC: 0.43 MG/DL
EGFR: 123 ML/MIN/1.73M2
GLUCOSE SERPL-MCNC: 113 MG/DL
MAGNESIUM SERPL-MCNC: 1.9 MG/DL
PHOSPHATE SERPL-MCNC: 2.3 MG/DL
POTASSIUM SERPL-SCNC: 4 MMOL/L
PROT SERPL-MCNC: 6.6 G/DL
SODIUM SERPL-SCNC: 139 MMOL/L

## 2024-06-22 LAB — HPV HIGH+LOW RISK DNA PNL CVX: NOT DETECTED

## 2024-06-25 LAB — CYTOLOGY CVX/VAG DOC THIN PREP: ABNORMAL

## 2024-07-01 ENCOUNTER — OUTPATIENT (OUTPATIENT)
Dept: OUTPATIENT SERVICES | Facility: HOSPITAL | Age: 44
LOS: 1 days | End: 2024-07-01

## 2024-07-01 ENCOUNTER — APPOINTMENT (OUTPATIENT)
Dept: MAMMOGRAPHY | Facility: HOSPITAL | Age: 44
End: 2024-07-01

## 2024-07-01 DIAGNOSIS — Z00.8 ENCOUNTER FOR OTHER GENERAL EXAMINATION: ICD-10-CM

## 2024-07-01 DIAGNOSIS — Z12.31 ENCOUNTER FOR SCREENING MAMMOGRAM FOR MALIGNANT NEOPLASM OF BREAST: ICD-10-CM

## 2024-07-15 ENCOUNTER — NON-APPOINTMENT (OUTPATIENT)
Age: 44
End: 2024-07-15

## 2024-07-16 ENCOUNTER — NON-APPOINTMENT (OUTPATIENT)
Age: 44
End: 2024-07-16

## 2024-07-18 ENCOUNTER — NON-APPOINTMENT (OUTPATIENT)
Age: 44
End: 2024-07-18

## 2024-07-23 ENCOUNTER — APPOINTMENT (OUTPATIENT)
Dept: MAMMOGRAPHY | Facility: HOSPITAL | Age: 44
End: 2024-07-23
Payer: COMMERCIAL

## 2024-07-23 ENCOUNTER — RESULT REVIEW (OUTPATIENT)
Age: 44
End: 2024-07-23

## 2024-07-23 PROCEDURE — 77067 SCR MAMMO BI INCL CAD: CPT | Mod: 26

## 2024-07-23 PROCEDURE — 77063 BREAST TOMOSYNTHESIS BI: CPT | Mod: 26

## 2024-07-24 PROBLEM — R92.8 ABNORMAL MAMMOGRAM: Status: ACTIVE | Noted: 2024-07-24

## 2024-07-29 ENCOUNTER — APPOINTMENT (OUTPATIENT)
Dept: FAMILY MEDICINE | Facility: HOSPITAL | Age: 44
End: 2024-07-29

## 2024-07-29 ENCOUNTER — OUTPATIENT (OUTPATIENT)
Dept: OUTPATIENT SERVICES | Facility: HOSPITAL | Age: 44
LOS: 1 days | End: 2024-07-29
Payer: SELF-PAY

## 2024-07-29 ENCOUNTER — RESULT REVIEW (OUTPATIENT)
Age: 44
End: 2024-07-29

## 2024-07-29 VITALS
HEART RATE: 74 BPM | TEMPERATURE: 97.3 F | DIASTOLIC BLOOD PRESSURE: 69 MMHG | BODY MASS INDEX: 37.5 KG/M2 | WEIGHT: 192 LBS | SYSTOLIC BLOOD PRESSURE: 99 MMHG | RESPIRATION RATE: 16 BRPM | OXYGEN SATURATION: 99 %

## 2024-07-29 DIAGNOSIS — E83.39 OTHER DISORDERS OF PHOSPHORUS METABOLISM: ICD-10-CM

## 2024-07-29 DIAGNOSIS — E66.9 OBESITY, UNSPECIFIED: ICD-10-CM

## 2024-07-29 DIAGNOSIS — Z00.00 ENCOUNTER FOR GENERAL ADULT MEDICAL EXAMINATION WITHOUT ABNORMAL FINDINGS: ICD-10-CM

## 2024-07-29 DIAGNOSIS — Z00.00 ENCOUNTER FOR GENERAL ADULT MEDICAL EXAMINATION W/OUT ABNORMAL FINDINGS: ICD-10-CM

## 2024-07-29 DIAGNOSIS — R79.0 ABNORMAL LVL OF BLOOD MINERAL: ICD-10-CM

## 2024-07-29 PROCEDURE — G0463: CPT

## 2024-07-29 PROCEDURE — 80061 LIPID PANEL: CPT

## 2024-07-29 PROCEDURE — 83036 HEMOGLOBIN GLYCOSYLATED A1C: CPT

## 2024-07-29 PROCEDURE — 84100 ASSAY OF PHOSPHORUS: CPT

## 2024-07-29 PROCEDURE — 86803 HEPATITIS C AB TEST: CPT

## 2024-07-29 PROCEDURE — 84443 ASSAY THYROID STIM HORMONE: CPT

## 2024-07-30 LAB
CHOLEST SERPL-MCNC: 137 MG/DL
ESTIMATED AVERAGE GLUCOSE: 108 MG/DL
HBA1C MFR BLD HPLC: 5.4 %
HDLC SERPL-MCNC: 39 MG/DL
LDLC SERPL CALC-MCNC: 83 MG/DL
NONHDLC SERPL-MCNC: 98 MG/DL
PHOSPHATE SERPL-MCNC: 2.8 MG/DL
TRIGL SERPL-MCNC: 75 MG/DL
TSH SERPL-ACNC: 1.96 UIU/ML

## 2024-07-31 LAB
HCV AB SER QL: NONREACTIVE
HCV S/CO RATIO: 0.16 S/CO

## 2024-07-31 NOTE — HISTORY OF PRESENT ILLNESS
[FreeTextEntry1] : CPE/follow up [de-identified] : 44F h/o vitamin D deficiency, obesity, varicose veins presents for CPE/follow up. pt has no acute complaints and generally feels well. she has varicose veins of right leg that are sometimes associated with pain that is relieved with motrin.

## 2024-07-31 NOTE — HEALTH RISK ASSESSMENT
[No] : In the past 12 months have you used drugs other than those required for medical reasons? 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

## 2024-07-31 NOTE — PHYSICAL EXAM
[No Acute Distress] : no acute distress [Well-Appearing] : well-appearing [Normal Sclera/Conjunctiva] : normal sclera/conjunctiva [PERRL] : pupils equal round and reactive to light [EOMI] : extraocular movements intact [Normal Oropharynx] : the oropharynx was normal [No Lymphadenopathy] : no lymphadenopathy [Supple] : supple [No Joint Swelling] : no joint swelling [Grossly Normal Strength/Tone] : grossly normal strength/tone [Normal Gait] : normal gait [Normal] : affect was normal and insight and judgment were intact [de-identified] : varicose veins bilateral

## 2024-07-31 NOTE — HEALTH RISK ASSESSMENT
[No] : In the past 12 months have you used drugs other than those required for medical reasons? 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

## 2024-07-31 NOTE — HEALTH RISK ASSESSMENT
[No] : In the past 12 months have you used drugs other than those required for medical reasons? 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

## 2024-07-31 NOTE — HISTORY OF PRESENT ILLNESS
[FreeTextEntry1] : CPE/follow up [de-identified] : 44F h/o vitamin D deficiency, obesity, varicose veins presents for CPE/follow up. pt has no acute complaints and generally feels well. she has varicose veins of right leg that are sometimes associated with pain that is relieved with motrin.

## 2024-07-31 NOTE — PHYSICAL EXAM
[No Acute Distress] : no acute distress [Well-Appearing] : well-appearing [Normal Sclera/Conjunctiva] : normal sclera/conjunctiva [PERRL] : pupils equal round and reactive to light [EOMI] : extraocular movements intact [Normal Oropharynx] : the oropharynx was normal [No Lymphadenopathy] : no lymphadenopathy [Supple] : supple [No Joint Swelling] : no joint swelling [Grossly Normal Strength/Tone] : grossly normal strength/tone [Normal Gait] : normal gait [Normal] : affect was normal and insight and judgment were intact [de-identified] : varicose veins bilateral

## 2024-07-31 NOTE — HISTORY OF PRESENT ILLNESS
[FreeTextEntry1] : CPE/follow up [de-identified] : 44F h/o vitamin D deficiency, obesity, varicose veins presents for CPE/follow up. pt has no acute complaints and generally feels well. she has varicose veins of right leg that are sometimes associated with pain that is relieved with motrin.

## 2024-07-31 NOTE — PHYSICAL EXAM
[No Acute Distress] : no acute distress [Well-Appearing] : well-appearing [Normal Sclera/Conjunctiva] : normal sclera/conjunctiva [PERRL] : pupils equal round and reactive to light [EOMI] : extraocular movements intact [Normal Oropharynx] : the oropharynx was normal [No Lymphadenopathy] : no lymphadenopathy [Supple] : supple [No Joint Swelling] : no joint swelling [Grossly Normal Strength/Tone] : grossly normal strength/tone [Normal Gait] : normal gait [Normal] : affect was normal and insight and judgment were intact [de-identified] : varicose veins bilateral

## 2024-08-01 ENCOUNTER — APPOINTMENT (OUTPATIENT)
Dept: MAMMOGRAPHY | Facility: HOSPITAL | Age: 44
End: 2024-08-01
Payer: COMMERCIAL

## 2024-08-01 ENCOUNTER — RESULT REVIEW (OUTPATIENT)
Age: 44
End: 2024-08-01

## 2024-08-01 ENCOUNTER — NON-APPOINTMENT (OUTPATIENT)
Age: 44
End: 2024-08-01

## 2024-08-01 ENCOUNTER — OUTPATIENT (OUTPATIENT)
Dept: OUTPATIENT SERVICES | Facility: HOSPITAL | Age: 44
LOS: 1 days | End: 2024-08-01
Payer: SELF-PAY

## 2024-08-01 DIAGNOSIS — R92.8 OTHER ABNORMAL AND INCONCLUSIVE FINDINGS ON DIAGNOSTIC IMAGING OF BREAST: ICD-10-CM

## 2024-08-01 PROCEDURE — 77065 DX MAMMO INCL CAD UNI: CPT | Mod: 26,RT

## 2024-08-01 PROCEDURE — G0279: CPT

## 2024-08-01 PROCEDURE — 77065 DX MAMMO INCL CAD UNI: CPT

## 2024-08-01 PROCEDURE — G0279: CPT | Mod: 26

## 2024-08-06 PROBLEM — U07.1 INFECTION DUE TO 2019 NOVEL CORONAVIRUS: Status: RESOLVED | Noted: 2020-04-22 | Resolved: 2024-08-06

## 2024-08-06 PROBLEM — Z20.822 SUSPECTED COVID-19 VIRUS INFECTION: Status: RESOLVED | Noted: 2020-04-22 | Resolved: 2024-08-06

## 2024-08-06 PROBLEM — Z87.898 HISTORY OF DYSURIA: Status: RESOLVED | Noted: 2022-08-19 | Resolved: 2024-08-06

## 2024-08-07 ENCOUNTER — OUTPATIENT (OUTPATIENT)
Dept: OUTPATIENT SERVICES | Facility: HOSPITAL | Age: 44
LOS: 1 days | End: 2024-08-07
Payer: SELF-PAY

## 2024-08-07 ENCOUNTER — APPOINTMENT (OUTPATIENT)
Dept: FAMILY MEDICINE | Facility: HOSPITAL | Age: 44
End: 2024-08-07

## 2024-08-07 DIAGNOSIS — Z00.00 ENCOUNTER FOR GENERAL ADULT MEDICAL EXAMINATION WITHOUT ABNORMAL FINDINGS: ICD-10-CM

## 2024-08-07 DIAGNOSIS — R92.8 OTHER ABNORMAL AND INCONCLUSIVE FINDINGS ON DIAGNOSTIC IMAGING OF BREAST: ICD-10-CM

## 2024-08-07 PROBLEM — R63.0 POOR APPETITE: Status: RESOLVED | Noted: 2020-04-16 | Resolved: 2024-08-07

## 2024-08-07 PROBLEM — Z12.39 SCREENING FOR BREAST CANCER: Status: RESOLVED | Noted: 2022-12-21 | Resolved: 2024-08-07

## 2024-08-07 PROBLEM — R79.89 LOW VITAMIN D LEVEL: Status: RESOLVED | Noted: 2022-09-02 | Resolved: 2024-08-07

## 2024-08-07 PROCEDURE — G0463: CPT

## 2024-08-07 NOTE — HISTORY OF PRESENT ILLNESS
[FreeTextEntry1] : f/u mammogram and labs [de-identified] : 44F PMH obesity, varicose veins presents for 1 week f/u for labs. Pt has h/o low serum phosphorus 2.3, recommended to eat phosphorus rich diet and repeat Phos 2.8. Pt had screening mammogram 7/23/24 and diagnostic right breast mammo 8/1/24 results below were discussed. Pt had stereotactic biopsy of right breast ordered, pending appointment on 8/20 for biopsy. Today, Pt reports feeling well with no breast pain, nipple discharge or breast changes. Discussed previous lab results.  Mammogram screening bilateral 7/23/24  IMPRESSION: Grouping of calcifications noted in the right upper inner quadrant anterior depth for which further evaluation with magnification imaging is recommended. The patient will be recalled for further evaluation with additional mammographic views of the right breast. RECOMMENDATION: Additional Imaging.   Mammogram diagnostic, right breast 8/1/24  IMPRESSION: 2 discrete groupings of indeterminate calcifications are seen in the right upper quadrant anterior depth following magnification imaging. A 2 stage stereotactic biopsy is recommended. (Please note these findings were discussed with the patient at the conclusion of this examination. Dr. Maguire was notified of these results at 10:15 AM on 8/1/2024.) RECOMMENDATION: Stereotactic biopsy. BI-RADS 4A -Suspicious Finding(s) -Low Suspicion for Malignancy

## 2024-08-07 NOTE — INTERPRETER SERVICES
[FreeTextEntry3] :  Patient declined  service. Patient felt comfortable speaking with provider in shared language, Arabic.

## 2024-08-20 ENCOUNTER — RESULT REVIEW (OUTPATIENT)
Age: 44
End: 2024-08-20

## 2024-08-20 ENCOUNTER — OUTPATIENT (OUTPATIENT)
Dept: OUTPATIENT SERVICES | Facility: HOSPITAL | Age: 44
LOS: 1 days | End: 2024-08-20
Payer: COMMERCIAL

## 2024-08-20 ENCOUNTER — APPOINTMENT (OUTPATIENT)
Dept: MAMMOGRAPHY | Facility: CLINIC | Age: 44
End: 2024-08-20
Payer: COMMERCIAL

## 2024-08-20 DIAGNOSIS — R92.8 OTHER ABNORMAL AND INCONCLUSIVE FINDINGS ON DIAGNOSTIC IMAGING OF BREAST: ICD-10-CM

## 2024-08-20 PROCEDURE — 76098 X-RAY EXAM SURGICAL SPECIMEN: CPT | Mod: 26

## 2024-08-20 PROCEDURE — 19081 BX BREAST 1ST LESION STRTCTC: CPT | Mod: RT

## 2024-08-20 PROCEDURE — 19081 BX BREAST 1ST LESION STRTCTC: CPT

## 2024-08-20 PROCEDURE — 77065 DX MAMMO INCL CAD UNI: CPT

## 2024-08-20 PROCEDURE — 88305 TISSUE EXAM BY PATHOLOGIST: CPT | Mod: 26

## 2024-08-20 PROCEDURE — 19284 PERQ DEV BREAST ADD STRTCTC: CPT

## 2024-08-20 PROCEDURE — A4648: CPT

## 2024-08-20 PROCEDURE — 88305 TISSUE EXAM BY PATHOLOGIST: CPT

## 2024-08-20 PROCEDURE — 77065 DX MAMMO INCL CAD UNI: CPT | Mod: 26,RT

## 2024-08-20 PROCEDURE — 19283 PERQ DEV BREAST 1ST STRTCTC: CPT | Mod: 59,RT

## 2024-08-26 ENCOUNTER — NON-APPOINTMENT (OUTPATIENT)
Age: 44
End: 2024-08-26

## 2024-08-29 ENCOUNTER — APPOINTMENT (OUTPATIENT)
Dept: FAMILY MEDICINE | Facility: HOSPITAL | Age: 44
End: 2024-08-29

## 2024-08-29 ENCOUNTER — OUTPATIENT (OUTPATIENT)
Dept: OUTPATIENT SERVICES | Facility: HOSPITAL | Age: 44
LOS: 1 days | End: 2024-08-29
Payer: SELF-PAY

## 2024-08-29 VITALS
TEMPERATURE: 97.9 F | WEIGHT: 186 LBS | SYSTOLIC BLOOD PRESSURE: 103 MMHG | HEART RATE: 78 BPM | OXYGEN SATURATION: 98 % | DIASTOLIC BLOOD PRESSURE: 70 MMHG | RESPIRATION RATE: 16 BRPM | BODY MASS INDEX: 36.33 KG/M2

## 2024-08-29 DIAGNOSIS — Z00.00 ENCOUNTER FOR GENERAL ADULT MEDICAL EXAMINATION WITHOUT ABNORMAL FINDINGS: ICD-10-CM

## 2024-08-29 DIAGNOSIS — N60.99 UNSPECIFIED BENIGN MAMMARY DYSPLASIA OF UNSPECIFIED BREAST: ICD-10-CM

## 2024-08-29 PROCEDURE — G0463: CPT

## 2024-08-29 NOTE — PHYSICAL EXAM
[No Acute Distress] : no acute distress [Well Developed] : well developed [Normal Sclera/Conjunctiva] : normal sclera/conjunctiva [EOMI] : extraocular movements intact [No Respiratory Distress] : no respiratory distress  [No Accessory Muscle Use] : no accessory muscle use [Clear to Auscultation] : lungs were clear to auscultation bilaterally [Normal Rate] : normal rate  [Regular Rhythm] : with a regular rhythm [Normal S1, S2] : normal S1 and S2 [No Murmur] : no murmur heard [No Edema] : there was no peripheral edema [Soft] : abdomen soft [Non Tender] : non-tender [Non-distended] : non-distended [Normal Bowel Sounds] : normal bowel sounds

## 2024-09-04 NOTE — INTERPRETER SERVICES
[Other: ______] : provided by JOHNATHON [Interpreters_IDNumber] : [id number] [Interpreters_FullName] : Cherry

## 2024-09-04 NOTE — INTERPRETER SERVICES
[Other: ______] : provided by JOHNATHON [Interpreters_IDNumber] : [id number] [Interpreters_FullName] : Cherry

## 2024-09-04 NOTE — HISTORY OF PRESENT ILLNESS
[FreeTextEntry1] : atypical ductal hyperplasic   [de-identified] : 45 y/o F who is here for biopsy findings: right breast: atypical ductal hyperplasic   No FH of CA Denies fever, chills, headache, chest pain, SOB, abdominal pain, diarrhea, urinary issues or other concerns.

## 2024-09-04 NOTE — INTERPRETER SERVICES
[Other: ______] : provided by JOHNATHON [Interpreters_IDNumber] : [id number] [Interpreters_FullName] : Cherry

## 2024-09-04 NOTE — HISTORY OF PRESENT ILLNESS
[FreeTextEntry1] : atypical ductal hyperplasic   [de-identified] : 43 y/o F who is here for biopsy findings: right breast: atypical ductal hyperplasic   No FH of CA Denies fever, chills, headache, chest pain, SOB, abdominal pain, diarrhea, urinary issues or other concerns.

## 2024-09-04 NOTE — HISTORY OF PRESENT ILLNESS
[FreeTextEntry1] : atypical ductal hyperplasic   [de-identified] : 45 y/o F who is here for biopsy findings: right breast: atypical ductal hyperplasic   No FH of CA Denies fever, chills, headache, chest pain, SOB, abdominal pain, diarrhea, urinary issues or other concerns.

## 2024-09-05 ENCOUNTER — APPOINTMENT (OUTPATIENT)
Dept: MAMMOGRAPHY | Facility: CLINIC | Age: 44
End: 2024-09-05

## 2024-09-05 ENCOUNTER — APPOINTMENT (OUTPATIENT)
Dept: ULTRASOUND IMAGING | Facility: CLINIC | Age: 44
End: 2024-09-05

## 2024-09-05 ENCOUNTER — OUTPATIENT (OUTPATIENT)
Dept: OUTPATIENT SERVICES | Facility: HOSPITAL | Age: 44
LOS: 1 days | End: 2024-09-05

## 2024-09-05 DIAGNOSIS — R92.8 OTHER ABNORMAL AND INCONCLUSIVE FINDINGS ON DIAGNOSTIC IMAGING OF BREAST: ICD-10-CM

## 2024-09-06 ENCOUNTER — NON-APPOINTMENT (OUTPATIENT)
Age: 44
End: 2024-09-06

## 2024-09-09 ENCOUNTER — NON-APPOINTMENT (OUTPATIENT)
Age: 44
End: 2024-09-09

## 2024-09-16 ENCOUNTER — NON-APPOINTMENT (OUTPATIENT)
Age: 44
End: 2024-09-16

## 2024-09-17 ENCOUNTER — APPOINTMENT (OUTPATIENT)
Dept: SURGICAL ONCOLOGY | Facility: CLINIC | Age: 44
End: 2024-09-17
Payer: COMMERCIAL

## 2024-09-17 VITALS
DIASTOLIC BLOOD PRESSURE: 67 MMHG | OXYGEN SATURATION: 99 % | HEIGHT: 59.5 IN | RESPIRATION RATE: 17 BRPM | BODY MASS INDEX: 36.6 KG/M2 | WEIGHT: 184 LBS | HEART RATE: 81 BPM | SYSTOLIC BLOOD PRESSURE: 100 MMHG

## 2024-09-17 DIAGNOSIS — N60.99 UNSPECIFIED BENIGN MAMMARY DYSPLASIA OF UNSPECIFIED BREAST: ICD-10-CM

## 2024-09-17 PROCEDURE — 99203 OFFICE O/P NEW LOW 30 MIN: CPT

## 2024-09-17 NOTE — ASSESSMENT
[FreeTextEntry1] : VALENTINA BLANTON is a 43 y/o F s/p core bx of 2 groupings of R UIQ calcifications. Right posterior medial calcs found to be benign but radiologically discordant. Right anterior central calcs w/ ADH.   We discussed the pathologic diagnosis of ADH in detail. Atypical Ductal Hyperplasia (ADH) is associated with an increased risk of future breast cancer, and when identified on core needle biopsy, may be associated with neighboring malignancy or may even be difficult to differentiate from low volume ductal carcinoma in-situ (DCIS).   I reviewed her imaging with her and we discussed surgical excision.    Surgical plan: Right breast natalio localized lumpectomy [bracketed], possible tissue transfer.   Surgical date: 10/28/2024 [facility].   - MRI breast for surgical planning.  - RTO for post-operative visit.

## 2024-09-17 NOTE — HISTORY OF PRESENT ILLNESS
[de-identified] : VALENTINA BLANTON is a 43 y/o F s/p core bx of 2 groupings of R UIQ calcifications. Right posterior medial calcs found to be benign but radiologically discordant. Right anterior central calcs w/ ADH.  Referred by: Breast .    She denies any palpable breast masses, nipple discharge or skin changes.  Findings were discovered on routine screening.   PMHx: vitamin D deficiency, obesity (BMI 36).  PSHx:  x3 (last in - unsure if at that time also had bilateral tubal ligation.  Meds: Denies  NKDA Family Hx: No family history of breast or ovarian cancer. GYN: , menarche age 15, LMP 2024. Age at first pregnancy  .  N.  Bra size: B cup / shorts bra.  Occupation: Works in a restaurant.  Social: Smoking: Denies        ETOH: Denies.

## 2024-09-17 NOTE — PHYSICAL EXAM
[Normal] : supple, no neck mass and thyroid not enlarged [Normal Neck Lymph Nodes] : normal neck lymph nodes  [Normal Supraclavicular Lymph Nodes] : normal supraclavicular lymph nodes [Normal Axillary Lymph Nodes] : normal axillary lymph nodes [Normal] : oriented to person, place and time, with appropriate affect [de-identified] : Right breast with post-biopsy changes, no palpable masses in either breast.  no clinical lymphadenopathy [de-identified] : Normal respiratory effort

## 2024-09-17 NOTE — HISTORY OF PRESENT ILLNESS
[de-identified] : VALENTINA BLANTON is a 45 y/o F s/p core bx of 2 groupings of R UIQ calcifications. Right posterior medial calcs found to be benign but radiologically discordant. Right anterior central calcs w/ ADH.  Referred by: Breast .    She denies any palpable breast masses, nipple discharge or skin changes.  Findings were discovered on routine screening.   PMHx: vitamin D deficiency, obesity (BMI 36).  PSHx:  x3 (last in - unsure if at that time also had bilateral tubal ligation.  Meds: Denies  NKDA Family Hx: No family history of breast or ovarian cancer. GYN: , menarche age 15, LMP 2024. Age at first pregnancy  .  N.  Bra size: B cup / shorts bra.  Occupation: Works in a restaurant.  Social: Smoking: Denies        ETOH: Denies.

## 2024-09-17 NOTE — PHYSICAL EXAM
[Normal] : supple, no neck mass and thyroid not enlarged [Normal Neck Lymph Nodes] : normal neck lymph nodes  [Normal Supraclavicular Lymph Nodes] : normal supraclavicular lymph nodes [Normal Axillary Lymph Nodes] : normal axillary lymph nodes [Normal] : oriented to person, place and time, with appropriate affect [de-identified] : Right breast with post-biopsy changes, no palpable masses in either breast.  no clinical lymphadenopathy [de-identified] : Normal respiratory effort

## 2024-09-17 NOTE — ASSESSMENT
[FreeTextEntry1] : VALENTINA LBANTON is a 43 y/o F s/p core bx of 2 groupings of R UIQ calcifications. Right posterior medial calcs found to be benign but radiologically discordant. Right anterior central calcs w/ ADH.   We discussed the pathologic diagnosis of ADH in detail. Atypical Ductal Hyperplasia (ADH) is associated with an increased risk of future breast cancer, and when identified on core needle biopsy, may be associated with neighboring malignancy or may even be difficult to differentiate from low volume ductal carcinoma in-situ (DCIS).   I reviewed her imaging with her and we discussed surgical excision.    Surgical plan: Right breast natalio localized lumpectomy [bracketed], possible tissue transfer.   Surgical date: 10/28/2024 [facility].   - MRI breast for surgical planning.  - RTO for post-operative visit.

## 2024-09-17 NOTE — ASSESSMENT
[FreeTextEntry1] : VALENTINA BLANTON is a 43 y/o F s/p core bx of 2 groupings of R UIQ calcifications. Right posterior medial calcs found to be benign but radiologically discordant. Right anterior central calcs w/ ADH.   We discussed the pathologic diagnosis of ADH in detail. Atypical Ductal Hyperplasia (ADH) is associated with an increased risk of future breast cancer, and when identified on core needle biopsy, may be associated with neighboring malignancy or may even be difficult to differentiate from low volume ductal carcinoma in-situ (DCIS).   I reviewed her imaging with her and we discussed surgical excision.    Surgical plan: Right breast natalio localized lumpectomy [bracketed], possible tissue transfer.   Surgical date: 10/28/2024 [facility].   - MRI breast for surgical planning.  - RTO for post-operative visit.

## 2024-09-17 NOTE — RESULTS/DATA
[FreeTextEntry1] : BREAST PATH/RAD REVIEW.  [facility].  7/23/24 BL SC MG (TC 12.3%, baseline): birads 0. HD. R UIQ anterior depth grouping of calcs. (Rec R Dx MG)  8/1/24 R Dx MG: birads 4A. HD. 2 discrete groupings of indeterminate R UIQ (anterior depth) calcs. (Rec 2-site R stereo bx).   8/20/24 2-site right breast stereotactic bx: 1. Right breast calcs, site A posterior: Benign breast tissue w/ adenosis & columnar cell change w/ calcs. Hourglass clip. Benign but discordant. (Rec Excision of posterior site).  2. Right breast calcs, site B anterior:  ADH, Calcs in columnar cell change and vessels, fragment of unremarkable skin. Cork clip. Benign & Concordant.

## 2024-09-17 NOTE — PHYSICAL EXAM
[Normal] : supple, no neck mass and thyroid not enlarged [Normal Neck Lymph Nodes] : normal neck lymph nodes  [Normal Supraclavicular Lymph Nodes] : normal supraclavicular lymph nodes [Normal Axillary Lymph Nodes] : normal axillary lymph nodes [Normal] : oriented to person, place and time, with appropriate affect [de-identified] : Right breast with post-biopsy changes, no palpable masses in either breast.  no clinical lymphadenopathy [de-identified] : Normal respiratory effort

## 2024-09-17 NOTE — RESULTS/DATA
[FreeTextEntry1] : BREAST PATH/RAD REVIEW.  [facility].  7/23/24 BL SC MG (TC 12.3%, baseline): birads 0. HD. R UIQ anterior depth grouping of calcs. (Rec R Dx MG)  8/1/24 R Dx MG: birads 4A. HD. 2 discrete groupings of indeterminate R UIQ (anterior depth) calcs. (Rec 2-site R stereo bx).   8/20/24 2-site right breast stereotactic bx: 1. Right breast calcs, site A posterior: Benign breast tissue w/ adenosis & columnar cell change w/ calcs. Hourglass clip. Benign but discordant. (Rec Excision of posterior site).  2. Right breast calcs, site B anterior:  ADH, Calcs in columnar cell change and vessels, fragment of unremarkable skin. Cork clip. Benign & Concordant.

## 2024-09-17 NOTE — RESULTS/DATA
[FreeTextEntry1] : BREAST PATH/RAD REVIEW.  [facility].  7/23/24 BL SC MG (TC 12.3%, baseline): birads 0. HD. R UIQ anterior depth grouping of calcs. (Rec R Dx MG)  8/1/24 R Dx MG: birads 4A. HD. 2 discrete groupings of indeterminate R UIQ (anterior depth) calcs. (Rec 2-site R stereo bx).   8/20/24 2-site right breast stereotactic bx: 1. Right breast calcs, site A posterior: Benign breast tissue w/ adenosis & columnar cell change w/ calcs. Hourglass clip. Benign but discordant. (Rec Excision of posterior site).  2. Right breast calcs, site B anterior:  ADH, Calcs in columnar cell change and vessels, fragment of unremarkable skin. Cork clip. Benign & Concordant.

## 2024-09-17 NOTE — HISTORY OF PRESENT ILLNESS
[de-identified] : VALENTINA BLANTON is a 45 y/o F s/p core bx of 2 groupings of R UIQ calcifications. Right posterior medial calcs found to be benign but radiologically discordant. Right anterior central calcs w/ ADH.  Referred by: Breast .    She denies any palpable breast masses, nipple discharge or skin changes.  Findings were discovered on routine screening.   PMHx: vitamin D deficiency, obesity (BMI 36).  PSHx:  x3 (last in - unsure if at that time also had bilateral tubal ligation.  Meds: Denies  NKDA Family Hx: No family history of breast or ovarian cancer. GYN: , menarche age 15, LMP 2024. Age at first pregnancy  .  N.  Bra size: B cup / shorts bra.  Occupation: Works in a restaurant.  Social: Smoking: Denies        ETOH: Denies.

## 2024-09-18 ENCOUNTER — NON-APPOINTMENT (OUTPATIENT)
Age: 44
End: 2024-09-18

## 2024-09-30 ENCOUNTER — OUTPATIENT (OUTPATIENT)
Dept: OUTPATIENT SERVICES | Facility: HOSPITAL | Age: 44
LOS: 1 days | End: 2024-09-30
Payer: COMMERCIAL

## 2024-09-30 ENCOUNTER — APPOINTMENT (OUTPATIENT)
Dept: MRI IMAGING | Facility: CLINIC | Age: 44
End: 2024-09-30
Payer: COMMERCIAL

## 2024-09-30 DIAGNOSIS — N60.99 UNSPECIFIED BENIGN MAMMARY DYSPLASIA OF UNSPECIFIED BREAST: ICD-10-CM

## 2024-09-30 PROCEDURE — 77049 MRI BREAST C-+ W/CAD BI: CPT | Mod: 26

## 2024-09-30 PROCEDURE — C8908: CPT

## 2024-09-30 PROCEDURE — A9585: CPT

## 2024-09-30 PROCEDURE — C8937: CPT

## 2024-10-03 DIAGNOSIS — R92.8 OTHER ABNORMAL AND INCONCLUSIVE FINDINGS ON DIAGNOSTIC IMAGING OF BREAST: ICD-10-CM

## 2024-10-18 ENCOUNTER — OUTPATIENT (OUTPATIENT)
Dept: OUTPATIENT SERVICES | Facility: HOSPITAL | Age: 44
LOS: 1 days | End: 2024-10-18
Payer: COMMERCIAL

## 2024-10-18 ENCOUNTER — RESULT REVIEW (OUTPATIENT)
Age: 44
End: 2024-10-18

## 2024-10-18 ENCOUNTER — APPOINTMENT (OUTPATIENT)
Dept: MRI IMAGING | Facility: CLINIC | Age: 44
End: 2024-10-18
Payer: COMMERCIAL

## 2024-10-18 DIAGNOSIS — R92.8 OTHER ABNORMAL AND INCONCLUSIVE FINDINGS ON DIAGNOSTIC IMAGING OF BREAST: ICD-10-CM

## 2024-10-18 PROCEDURE — A4648: CPT

## 2024-10-18 PROCEDURE — 19085 BX BREAST 1ST LESION MR IMAG: CPT | Mod: LT

## 2024-10-18 PROCEDURE — 77065 DX MAMMO INCL CAD UNI: CPT

## 2024-10-18 PROCEDURE — 19085 BX BREAST 1ST LESION MR IMAG: CPT

## 2024-10-18 PROCEDURE — 77065 DX MAMMO INCL CAD UNI: CPT | Mod: 26,LT

## 2024-10-18 PROCEDURE — A9585: CPT

## 2024-10-22 PROBLEM — Z78.9 OTHER SPECIFIED HEALTH STATUS: Chronic | Status: INACTIVE | Noted: 2021-06-17 | Resolved: 2024-10-22

## 2024-10-24 ENCOUNTER — NON-APPOINTMENT (OUTPATIENT)
Age: 44
End: 2024-10-24

## 2024-10-28 ENCOUNTER — APPOINTMENT (OUTPATIENT)
Dept: SURGICAL ONCOLOGY | Facility: HOSPITAL | Age: 44
End: 2024-10-28

## 2024-10-28 PROBLEM — Z86.39 PERSONAL HISTORY OF OTHER ENDOCRINE, NUTRITIONAL AND METABOLIC DISEASE: Chronic | Status: ACTIVE | Noted: 2024-10-22

## 2025-01-31 NOTE — HISTORY OF PRESENT ILLNESS
Patient had office visit today and CPAP ordered.    [FreeTextEntry8] : 41 F presenting for acute visit. Reports tender mass on labia. Recently seen and treated for vulvar abscess s/p I&D and placed on oral antibiotics. Pt completed course then felt lump in R labia that is intermittently uncomfortable. Worried that it may be another abscess. Denies swelling, erythema, discharge, bleeding, or fever. Other lesion is w/o issues.

## 2025-04-21 NOTE — ED ADULT NURSE NOTE - NSSEPSISSUSPECTED_ED_A_ED
Pt calling to speak with a nurse about her procedure tomorrow. She has urgency and feels that she needs to have a bowel movement but nothing is coming out. She used a rectal thermometer to check for any stool and it came out clean. She has also been taking extra miralax and dulcolax   No